# Patient Record
Sex: FEMALE | Race: WHITE | NOT HISPANIC OR LATINO | URBAN - METROPOLITAN AREA
[De-identification: names, ages, dates, MRNs, and addresses within clinical notes are randomized per-mention and may not be internally consistent; named-entity substitution may affect disease eponyms.]

---

## 2018-03-02 PROBLEM — Z00.00 ENCOUNTER FOR PREVENTIVE HEALTH EXAMINATION: Status: ACTIVE | Noted: 2018-03-02

## 2018-03-05 ENCOUNTER — OUTPATIENT (OUTPATIENT)
Dept: OUTPATIENT SERVICES | Facility: HOSPITAL | Age: 58
LOS: 1 days | Discharge: HOME | End: 2018-03-05
Payer: COMMERCIAL

## 2018-03-05 DIAGNOSIS — C50.919 MALIGNANT NEOPLASM OF UNSPECIFIED SITE OF UNSPECIFIED FEMALE BREAST: ICD-10-CM

## 2018-03-06 ENCOUNTER — RESULT REVIEW (OUTPATIENT)
Age: 58
End: 2018-03-06

## 2018-03-06 ENCOUNTER — APPOINTMENT (OUTPATIENT)
Dept: HEMATOLOGY ONCOLOGY | Facility: CLINIC | Age: 58
End: 2018-03-06
Payer: COMMERCIAL

## 2018-03-06 VITALS
HEIGHT: 63.86 IN | BODY MASS INDEX: 27.66 KG/M2 | OXYGEN SATURATION: 98 % | SYSTOLIC BLOOD PRESSURE: 116 MMHG | TEMPERATURE: 98.1 F | DIASTOLIC BLOOD PRESSURE: 75 MMHG | WEIGHT: 160.05 LBS | HEART RATE: 84 BPM | RESPIRATION RATE: 16 BRPM

## 2018-03-06 DIAGNOSIS — Z87.19 PERSONAL HISTORY OF OTHER DISEASES OF THE DIGESTIVE SYSTEM: ICD-10-CM

## 2018-03-06 DIAGNOSIS — I25.84 ATHEROSCLEROTIC HEART DISEASE OF NATIVE CORONARY ARTERY W/OUT ANGINA PECTORIS: ICD-10-CM

## 2018-03-06 DIAGNOSIS — J44.9 CHRONIC OBSTRUCTIVE PULMONARY DISEASE, UNSPECIFIED: ICD-10-CM

## 2018-03-06 DIAGNOSIS — E78.00 PURE HYPERCHOLESTEROLEMIA, UNSPECIFIED: ICD-10-CM

## 2018-03-06 DIAGNOSIS — R73.09 OTHER ABNORMAL GLUCOSE: ICD-10-CM

## 2018-03-06 DIAGNOSIS — N84.0 POLYP OF CORPUS UTERI: ICD-10-CM

## 2018-03-06 DIAGNOSIS — Z78.9 OTHER SPECIFIED HEALTH STATUS: ICD-10-CM

## 2018-03-06 DIAGNOSIS — I87.8 OTHER SPECIFIED DISORDERS OF VEINS: ICD-10-CM

## 2018-03-06 DIAGNOSIS — I25.10 ATHEROSCLEROTIC HEART DISEASE OF NATIVE CORONARY ARTERY W/OUT ANGINA PECTORIS: ICD-10-CM

## 2018-03-06 DIAGNOSIS — K29.80 DUODENITIS W/OUT BLEEDING: ICD-10-CM

## 2018-03-06 DIAGNOSIS — Z80.3 FAMILY HISTORY OF MALIGNANT NEOPLASM OF BREAST: ICD-10-CM

## 2018-03-06 DIAGNOSIS — Z87.891 PERSONAL HISTORY OF NICOTINE DEPENDENCE: ICD-10-CM

## 2018-03-06 LAB
HCT VFR BLD CALC: 40.7 % — SIGNIFICANT CHANGE UP (ref 34.5–45)
HGB BLD-MCNC: 14.2 G/DL — SIGNIFICANT CHANGE UP (ref 11.5–15.5)
MCHC RBC-ENTMCNC: 31.9 PG — SIGNIFICANT CHANGE UP (ref 27–34)
MCHC RBC-ENTMCNC: 35 G/DL — SIGNIFICANT CHANGE UP (ref 32–36)
MCV RBC AUTO: 91 FL — SIGNIFICANT CHANGE UP (ref 80–100)
PLATELET # BLD AUTO: 327 K/UL — SIGNIFICANT CHANGE UP (ref 150–400)
RBC # BLD: 4.47 M/UL — SIGNIFICANT CHANGE UP (ref 3.8–5.2)
RBC # FLD: 11.6 % — SIGNIFICANT CHANGE UP (ref 10.3–14.5)
WBC # BLD: 5.3 K/UL — SIGNIFICANT CHANGE UP (ref 3.8–10.5)
WBC # FLD AUTO: 5.3 K/UL — SIGNIFICANT CHANGE UP (ref 3.8–10.5)

## 2018-03-06 PROCEDURE — 99245 OFF/OP CONSLTJ NEW/EST HI 55: CPT

## 2018-03-06 RX ORDER — BUDESONIDE AND FORMOTEROL FUMARATE DIHYDRATE 160; 4.5 UG/1; UG/1
160-4.5 AEROSOL RESPIRATORY (INHALATION)
Refills: 0 | Status: ACTIVE | COMMUNITY
Start: 2018-03-06

## 2018-03-06 RX ORDER — ROSUVASTATIN CALCIUM 5 MG/1
5 TABLET, FILM COATED ORAL
Refills: 0 | Status: ACTIVE | COMMUNITY
Start: 2018-03-06

## 2018-03-07 ENCOUNTER — TRANSCRIPTION ENCOUNTER (OUTPATIENT)
Age: 58
End: 2018-03-07

## 2018-03-07 PROBLEM — R73.09 ELEVATED HEMOGLOBIN A1C: Status: ACTIVE | Noted: 2018-03-07

## 2018-03-07 PROBLEM — Z87.891 FORMER SMOKER: Status: ACTIVE | Noted: 2018-03-06

## 2018-03-07 LAB
ALBUMIN SERPL ELPH-MCNC: 4.5 G/DL
ALP BLD-CCNC: 68 U/L
ALT SERPL-CCNC: 23 U/L
ANION GAP SERPL CALC-SCNC: 14 MMOL/L
APTT BLD: 33.4 SEC
AST SERPL-CCNC: 26 U/L
BILIRUB SERPL-MCNC: 0.4 MG/DL
BUN SERPL-MCNC: 14 MG/DL
CALCIUM SERPL-MCNC: 9.6 MG/DL
CHLORIDE SERPL-SCNC: 101 MMOL/L
CO2 SERPL-SCNC: 29 MMOL/L
CREAT SERPL-MCNC: 1.09 MG/DL
GLUCOSE SERPL-MCNC: 94 MG/DL
INR PPP: 0.93 RATIO
POTASSIUM SERPL-SCNC: 4.9 MMOL/L
PROT SERPL-MCNC: 7.5 G/DL
PT BLD: 10.5 SEC
SODIUM SERPL-SCNC: 144 MMOL/L

## 2018-03-08 DIAGNOSIS — C50.912 MALIGNANT NEOPLASM OF UNSPECIFIED SITE OF LEFT FEMALE BREAST: ICD-10-CM

## 2018-03-12 ENCOUNTER — RESULT REVIEW (OUTPATIENT)
Age: 58
End: 2018-03-12

## 2018-03-12 PROCEDURE — 88321 CONSLTJ&REPRT SLD PREP ELSWR: CPT

## 2018-03-13 ENCOUNTER — FORM ENCOUNTER (OUTPATIENT)
Age: 58
End: 2018-03-13

## 2018-03-13 PROBLEM — N84.0 POLYP OF CORPUS UTERI: Chronic | Status: ACTIVE | Noted: 2018-03-09

## 2018-03-13 PROBLEM — K20.9 ESOPHAGITIS, UNSPECIFIED: Chronic | Status: ACTIVE | Noted: 2018-03-09

## 2018-03-13 PROBLEM — K29.70 GASTRITIS, UNSPECIFIED, WITHOUT BLEEDING: Chronic | Status: ACTIVE | Noted: 2018-03-09

## 2018-03-14 ENCOUNTER — OUTPATIENT (OUTPATIENT)
Dept: OUTPATIENT SERVICES | Facility: HOSPITAL | Age: 58
LOS: 1 days | End: 2018-03-14
Payer: COMMERCIAL

## 2018-03-14 DIAGNOSIS — C50.912 MALIGNANT NEOPLASM OF UNSPECIFIED SITE OF LEFT FEMALE BREAST: ICD-10-CM

## 2018-03-14 DIAGNOSIS — I87.8 OTHER SPECIFIED DISORDERS OF VEINS: ICD-10-CM

## 2018-03-14 DIAGNOSIS — Z98.890 OTHER SPECIFIED POSTPROCEDURAL STATES: Chronic | ICD-10-CM

## 2018-03-14 DIAGNOSIS — Z90.710 ACQUIRED ABSENCE OF BOTH CERVIX AND UTERUS: Chronic | ICD-10-CM

## 2018-03-14 PROCEDURE — C1769: CPT

## 2018-03-14 PROCEDURE — 76937 US GUIDE VASCULAR ACCESS: CPT | Mod: 26

## 2018-03-14 PROCEDURE — 36561 INSERT TUNNELED CV CATH: CPT

## 2018-03-14 PROCEDURE — C1894: CPT

## 2018-03-14 PROCEDURE — 77001 FLUOROGUIDE FOR VEIN DEVICE: CPT | Mod: 26

## 2018-03-14 PROCEDURE — 77001 FLUOROGUIDE FOR VEIN DEVICE: CPT

## 2018-03-14 PROCEDURE — 76937 US GUIDE VASCULAR ACCESS: CPT

## 2018-03-14 PROCEDURE — C1788: CPT

## 2018-03-15 DIAGNOSIS — Z45.2 ENCOUNTER FOR ADJUSTMENT AND MANAGEMENT OF VASCULAR ACCESS DEVICE: ICD-10-CM

## 2018-03-16 ENCOUNTER — APPOINTMENT (OUTPATIENT)
Dept: INFUSION THERAPY | Facility: HOSPITAL | Age: 58
End: 2018-03-16

## 2018-03-16 ENCOUNTER — RESULT REVIEW (OUTPATIENT)
Age: 58
End: 2018-03-16

## 2018-03-16 ENCOUNTER — LABORATORY RESULT (OUTPATIENT)
Age: 58
End: 2018-03-16

## 2018-03-16 ENCOUNTER — OTHER (OUTPATIENT)
Age: 58
End: 2018-03-16

## 2018-03-16 LAB
HCT VFR BLD CALC: 42 % — SIGNIFICANT CHANGE UP (ref 34.5–45)
HGB BLD-MCNC: 14.7 G/DL — SIGNIFICANT CHANGE UP (ref 11.5–15.5)
MCHC RBC-ENTMCNC: 31.7 PG — SIGNIFICANT CHANGE UP (ref 27–34)
MCHC RBC-ENTMCNC: 34.9 G/DL — SIGNIFICANT CHANGE UP (ref 32–36)
MCV RBC AUTO: 90.7 FL — SIGNIFICANT CHANGE UP (ref 80–100)
PLATELET # BLD AUTO: 328 K/UL — SIGNIFICANT CHANGE UP (ref 150–400)
RBC # BLD: 4.63 M/UL — SIGNIFICANT CHANGE UP (ref 3.8–5.2)
RBC # FLD: 11.7 % — SIGNIFICANT CHANGE UP (ref 10.3–14.5)
WBC # BLD: 9 K/UL — SIGNIFICANT CHANGE UP (ref 3.8–10.5)
WBC # FLD AUTO: 9 K/UL — SIGNIFICANT CHANGE UP (ref 3.8–10.5)

## 2018-03-19 DIAGNOSIS — Z51.11 ENCOUNTER FOR ANTINEOPLASTIC CHEMOTHERAPY: ICD-10-CM

## 2018-03-19 DIAGNOSIS — R11.2 NAUSEA WITH VOMITING, UNSPECIFIED: ICD-10-CM

## 2018-03-19 DIAGNOSIS — Z51.89 ENCOUNTER FOR OTHER SPECIFIED AFTERCARE: ICD-10-CM

## 2018-03-30 ENCOUNTER — APPOINTMENT (OUTPATIENT)
Dept: HEMATOLOGY ONCOLOGY | Facility: CLINIC | Age: 58
End: 2018-03-30
Payer: COMMERCIAL

## 2018-03-30 ENCOUNTER — APPOINTMENT (OUTPATIENT)
Dept: INFUSION THERAPY | Facility: HOSPITAL | Age: 58
End: 2018-03-30

## 2018-03-30 ENCOUNTER — RESULT REVIEW (OUTPATIENT)
Age: 58
End: 2018-03-30

## 2018-03-30 ENCOUNTER — LABORATORY RESULT (OUTPATIENT)
Age: 58
End: 2018-03-30

## 2018-03-30 VITALS
HEART RATE: 87 BPM | DIASTOLIC BLOOD PRESSURE: 78 MMHG | RESPIRATION RATE: 16 BRPM | OXYGEN SATURATION: 97 % | WEIGHT: 162 LBS | SYSTOLIC BLOOD PRESSURE: 115 MMHG | BODY MASS INDEX: 27.93 KG/M2 | TEMPERATURE: 98.3 F

## 2018-03-30 LAB
HCT VFR BLD CALC: 39 % — SIGNIFICANT CHANGE UP (ref 34.5–45)
HGB BLD-MCNC: 13.7 G/DL — SIGNIFICANT CHANGE UP (ref 11.5–15.5)
MCHC RBC-ENTMCNC: 31.7 PG — SIGNIFICANT CHANGE UP (ref 27–34)
MCHC RBC-ENTMCNC: 35.1 G/DL — SIGNIFICANT CHANGE UP (ref 32–36)
MCV RBC AUTO: 90.2 FL — SIGNIFICANT CHANGE UP (ref 80–100)
PLATELET # BLD AUTO: 345 K/UL — SIGNIFICANT CHANGE UP (ref 150–400)
RBC # BLD: 4.32 M/UL — SIGNIFICANT CHANGE UP (ref 3.8–5.2)
RBC # FLD: 11.7 % — SIGNIFICANT CHANGE UP (ref 10.3–14.5)
WBC # BLD: 10.5 K/UL — SIGNIFICANT CHANGE UP (ref 3.8–10.5)
WBC # FLD AUTO: 10.5 K/UL — SIGNIFICANT CHANGE UP (ref 3.8–10.5)

## 2018-03-30 PROCEDURE — 99214 OFFICE O/P EST MOD 30 MIN: CPT

## 2018-04-10 ENCOUNTER — OUTPATIENT (OUTPATIENT)
Dept: OUTPATIENT SERVICES | Facility: HOSPITAL | Age: 58
LOS: 1 days | Discharge: HOME | End: 2018-04-10

## 2018-04-10 DIAGNOSIS — Z98.890 OTHER SPECIFIED POSTPROCEDURAL STATES: Chronic | ICD-10-CM

## 2018-04-10 DIAGNOSIS — Z90.710 ACQUIRED ABSENCE OF BOTH CERVIX AND UTERUS: Chronic | ICD-10-CM

## 2018-04-10 DIAGNOSIS — C50.912 MALIGNANT NEOPLASM OF UNSPECIFIED SITE OF LEFT FEMALE BREAST: ICD-10-CM

## 2018-04-13 ENCOUNTER — RESULT REVIEW (OUTPATIENT)
Age: 58
End: 2018-04-13

## 2018-04-13 ENCOUNTER — APPOINTMENT (OUTPATIENT)
Dept: HEMATOLOGY ONCOLOGY | Facility: CLINIC | Age: 58
End: 2018-04-13
Payer: COMMERCIAL

## 2018-04-13 ENCOUNTER — LABORATORY RESULT (OUTPATIENT)
Age: 58
End: 2018-04-13

## 2018-04-13 ENCOUNTER — APPOINTMENT (OUTPATIENT)
Dept: INFUSION THERAPY | Facility: HOSPITAL | Age: 58
End: 2018-04-13

## 2018-04-13 VITALS
HEART RATE: 85 BPM | SYSTOLIC BLOOD PRESSURE: 131 MMHG | RESPIRATION RATE: 16 BRPM | TEMPERATURE: 98.3 F | OXYGEN SATURATION: 98 % | BODY MASS INDEX: 27.94 KG/M2 | WEIGHT: 162.04 LBS | DIASTOLIC BLOOD PRESSURE: 76 MMHG

## 2018-04-13 DIAGNOSIS — E55.9 VITAMIN D DEFICIENCY, UNSPECIFIED: ICD-10-CM

## 2018-04-13 LAB
HCT VFR BLD CALC: 34.9 % — SIGNIFICANT CHANGE UP (ref 34.5–45)
HGB BLD-MCNC: 12 G/DL — SIGNIFICANT CHANGE UP (ref 11.5–15.5)
MCHC RBC-ENTMCNC: 30.8 PG — SIGNIFICANT CHANGE UP (ref 27–34)
MCHC RBC-ENTMCNC: 34.2 G/DL — SIGNIFICANT CHANGE UP (ref 32–36)
MCV RBC AUTO: 89.9 FL — SIGNIFICANT CHANGE UP (ref 80–100)
PLATELET # BLD AUTO: 236 K/UL — SIGNIFICANT CHANGE UP (ref 150–400)
RBC # BLD: 3.88 M/UL — SIGNIFICANT CHANGE UP (ref 3.8–5.2)
RBC # FLD: 12.4 % — SIGNIFICANT CHANGE UP (ref 10.3–14.5)
WBC # BLD: 9.5 K/UL — SIGNIFICANT CHANGE UP (ref 3.8–10.5)
WBC # FLD AUTO: 9.5 K/UL — SIGNIFICANT CHANGE UP (ref 3.8–10.5)

## 2018-04-13 PROCEDURE — 99214 OFFICE O/P EST MOD 30 MIN: CPT

## 2018-04-13 RX ORDER — IBUPROFEN 200 MG/1
200 TABLET, COATED ORAL
Refills: 0 | Status: DISCONTINUED | COMMUNITY
Start: 2018-03-06 | End: 2018-04-13

## 2018-04-16 DIAGNOSIS — E86.0 DEHYDRATION: ICD-10-CM

## 2018-04-16 DIAGNOSIS — Z51.11 ENCOUNTER FOR ANTINEOPLASTIC CHEMOTHERAPY: ICD-10-CM

## 2018-04-16 DIAGNOSIS — R11.2 NAUSEA WITH VOMITING, UNSPECIFIED: ICD-10-CM

## 2018-04-16 DIAGNOSIS — Z51.89 ENCOUNTER FOR OTHER SPECIFIED AFTERCARE: ICD-10-CM

## 2018-04-26 ENCOUNTER — FORM ENCOUNTER (OUTPATIENT)
Age: 58
End: 2018-04-26

## 2018-04-27 ENCOUNTER — RESULT REVIEW (OUTPATIENT)
Age: 58
End: 2018-04-27

## 2018-04-27 ENCOUNTER — APPOINTMENT (OUTPATIENT)
Dept: RADIOLOGY | Facility: IMAGING CENTER | Age: 58
End: 2018-04-27
Payer: COMMERCIAL

## 2018-04-27 ENCOUNTER — APPOINTMENT (OUTPATIENT)
Dept: INFUSION THERAPY | Facility: HOSPITAL | Age: 58
End: 2018-04-27

## 2018-04-27 ENCOUNTER — LABORATORY RESULT (OUTPATIENT)
Age: 58
End: 2018-04-27

## 2018-04-27 ENCOUNTER — OUTPATIENT (OUTPATIENT)
Dept: OUTPATIENT SERVICES | Facility: HOSPITAL | Age: 58
LOS: 1 days | End: 2018-04-27
Payer: COMMERCIAL

## 2018-04-27 ENCOUNTER — APPOINTMENT (OUTPATIENT)
Dept: HEMATOLOGY ONCOLOGY | Facility: CLINIC | Age: 58
End: 2018-04-27
Payer: COMMERCIAL

## 2018-04-27 VITALS
BODY MASS INDEX: 28.13 KG/M2 | RESPIRATION RATE: 16 BRPM | OXYGEN SATURATION: 97 % | SYSTOLIC BLOOD PRESSURE: 112 MMHG | HEART RATE: 84 BPM | WEIGHT: 163.14 LBS | TEMPERATURE: 98.6 F | DIASTOLIC BLOOD PRESSURE: 68 MMHG

## 2018-04-27 DIAGNOSIS — Z90.710 ACQUIRED ABSENCE OF BOTH CERVIX AND UTERUS: Chronic | ICD-10-CM

## 2018-04-27 DIAGNOSIS — C50.912 MALIGNANT NEOPLASM OF UNSPECIFIED SITE OF LEFT FEMALE BREAST: ICD-10-CM

## 2018-04-27 DIAGNOSIS — Z98.890 OTHER SPECIFIED POSTPROCEDURAL STATES: Chronic | ICD-10-CM

## 2018-04-27 LAB
HCT VFR BLD CALC: 36.9 % — SIGNIFICANT CHANGE UP (ref 34.5–45)
HGB BLD-MCNC: 12.8 G/DL — SIGNIFICANT CHANGE UP (ref 11.5–15.5)
MCHC RBC-ENTMCNC: 31.8 PG — SIGNIFICANT CHANGE UP (ref 27–34)
MCHC RBC-ENTMCNC: 34.7 G/DL — SIGNIFICANT CHANGE UP (ref 32–36)
MCV RBC AUTO: 91.7 FL — SIGNIFICANT CHANGE UP (ref 80–100)
PLATELET # BLD AUTO: 220 K/UL — SIGNIFICANT CHANGE UP (ref 150–400)
RBC # BLD: 4.03 M/UL — SIGNIFICANT CHANGE UP (ref 3.8–5.2)
RBC # FLD: 13 % — SIGNIFICANT CHANGE UP (ref 10.3–14.5)
WBC # BLD: 13.1 K/UL — HIGH (ref 3.8–10.5)
WBC # FLD AUTO: 13.1 K/UL — HIGH (ref 3.8–10.5)

## 2018-04-27 PROCEDURE — 72100 X-RAY EXAM L-S SPINE 2/3 VWS: CPT | Mod: 26

## 2018-04-27 PROCEDURE — 72070 X-RAY EXAM THORAC SPINE 2VWS: CPT | Mod: 26

## 2018-04-27 PROCEDURE — 72100 X-RAY EXAM L-S SPINE 2/3 VWS: CPT

## 2018-04-27 PROCEDURE — 99214 OFFICE O/P EST MOD 30 MIN: CPT

## 2018-04-27 PROCEDURE — 72070 X-RAY EXAM THORAC SPINE 2VWS: CPT

## 2018-05-09 ENCOUNTER — OUTPATIENT (OUTPATIENT)
Dept: OUTPATIENT SERVICES | Facility: HOSPITAL | Age: 58
LOS: 1 days | Discharge: HOME | End: 2018-05-09

## 2018-05-09 DIAGNOSIS — C50.912 MALIGNANT NEOPLASM OF UNSPECIFIED SITE OF LEFT FEMALE BREAST: ICD-10-CM

## 2018-05-09 DIAGNOSIS — Z90.710 ACQUIRED ABSENCE OF BOTH CERVIX AND UTERUS: Chronic | ICD-10-CM

## 2018-05-09 DIAGNOSIS — Z98.890 OTHER SPECIFIED POSTPROCEDURAL STATES: Chronic | ICD-10-CM

## 2018-05-11 ENCOUNTER — APPOINTMENT (OUTPATIENT)
Dept: INFUSION THERAPY | Facility: HOSPITAL | Age: 58
End: 2018-05-11

## 2018-05-11 ENCOUNTER — APPOINTMENT (OUTPATIENT)
Dept: HEMATOLOGY ONCOLOGY | Facility: CLINIC | Age: 58
End: 2018-05-11
Payer: COMMERCIAL

## 2018-05-11 ENCOUNTER — RESULT REVIEW (OUTPATIENT)
Age: 58
End: 2018-05-11

## 2018-05-11 VITALS
HEART RATE: 80 BPM | DIASTOLIC BLOOD PRESSURE: 68 MMHG | BODY MASS INDEX: 27.94 KG/M2 | OXYGEN SATURATION: 100 % | TEMPERATURE: 98 F | RESPIRATION RATE: 16 BRPM | SYSTOLIC BLOOD PRESSURE: 108 MMHG | WEIGHT: 162.04 LBS

## 2018-05-11 LAB
HCT VFR BLD CALC: 37.6 % — SIGNIFICANT CHANGE UP (ref 34.5–45)
HGB BLD-MCNC: 13.2 G/DL — SIGNIFICANT CHANGE UP (ref 11.5–15.5)
MCHC RBC-ENTMCNC: 32.1 PG — SIGNIFICANT CHANGE UP (ref 27–34)
MCHC RBC-ENTMCNC: 35.1 G/DL — SIGNIFICANT CHANGE UP (ref 32–36)
MCV RBC AUTO: 91.4 FL — SIGNIFICANT CHANGE UP (ref 80–100)
PLATELET # BLD AUTO: 238 K/UL — SIGNIFICANT CHANGE UP (ref 150–400)
RBC # BLD: 4.11 M/UL — SIGNIFICANT CHANGE UP (ref 3.8–5.2)
RBC # FLD: 13.5 % — SIGNIFICANT CHANGE UP (ref 10.3–14.5)
WBC # BLD: 11.2 K/UL — HIGH (ref 3.8–10.5)
WBC # FLD AUTO: 11.2 K/UL — HIGH (ref 3.8–10.5)

## 2018-05-11 PROCEDURE — 99215 OFFICE O/P EST HI 40 MIN: CPT

## 2018-05-11 RX ORDER — CHOLECALCIFEROL (VITAMIN D3) 1250 MCG
1.25 MG CAPSULE ORAL
Refills: 0 | Status: DISCONTINUED | COMMUNITY
Start: 2018-03-06 | End: 2018-05-11

## 2018-05-14 DIAGNOSIS — R11.2 NAUSEA WITH VOMITING, UNSPECIFIED: ICD-10-CM

## 2018-05-14 DIAGNOSIS — E86.0 DEHYDRATION: ICD-10-CM

## 2018-05-14 DIAGNOSIS — Z51.11 ENCOUNTER FOR ANTINEOPLASTIC CHEMOTHERAPY: ICD-10-CM

## 2018-05-14 DIAGNOSIS — Z51.89 ENCOUNTER FOR OTHER SPECIFIED AFTERCARE: ICD-10-CM

## 2018-05-25 ENCOUNTER — APPOINTMENT (OUTPATIENT)
Dept: INFUSION THERAPY | Facility: HOSPITAL | Age: 58
End: 2018-05-25

## 2018-05-25 ENCOUNTER — RESULT REVIEW (OUTPATIENT)
Age: 58
End: 2018-05-25

## 2018-05-25 ENCOUNTER — APPOINTMENT (OUTPATIENT)
Dept: HEMATOLOGY ONCOLOGY | Facility: CLINIC | Age: 58
End: 2018-05-25
Payer: COMMERCIAL

## 2018-05-25 ENCOUNTER — LABORATORY RESULT (OUTPATIENT)
Age: 58
End: 2018-05-25

## 2018-05-25 VITALS
DIASTOLIC BLOOD PRESSURE: 80 MMHG | TEMPERATURE: 98.5 F | RESPIRATION RATE: 16 BRPM | BODY MASS INDEX: 28.89 KG/M2 | HEART RATE: 76 BPM | OXYGEN SATURATION: 100 % | SYSTOLIC BLOOD PRESSURE: 120 MMHG | WEIGHT: 167.55 LBS

## 2018-05-25 LAB
HCT VFR BLD CALC: 35.8 % — SIGNIFICANT CHANGE UP (ref 34.5–45)
HGB BLD-MCNC: 12.6 G/DL — SIGNIFICANT CHANGE UP (ref 11.5–15.5)
MCHC RBC-ENTMCNC: 31.9 PG — SIGNIFICANT CHANGE UP (ref 27–34)
MCHC RBC-ENTMCNC: 35.2 G/DL — SIGNIFICANT CHANGE UP (ref 32–36)
MCV RBC AUTO: 90.8 FL — SIGNIFICANT CHANGE UP (ref 80–100)
PLATELET # BLD AUTO: 207 K/UL — SIGNIFICANT CHANGE UP (ref 150–400)
RBC # BLD: 3.95 M/UL — SIGNIFICANT CHANGE UP (ref 3.8–5.2)
RBC # FLD: 13.6 % — SIGNIFICANT CHANGE UP (ref 10.3–14.5)
WBC # BLD: 11 K/UL — HIGH (ref 3.8–10.5)
WBC # FLD AUTO: 11 K/UL — HIGH (ref 3.8–10.5)

## 2018-05-25 PROCEDURE — 99214 OFFICE O/P EST MOD 30 MIN: CPT

## 2018-06-01 ENCOUNTER — OTHER (OUTPATIENT)
Age: 58
End: 2018-06-01

## 2018-06-04 ENCOUNTER — OTHER (OUTPATIENT)
Age: 58
End: 2018-06-04

## 2018-06-06 ENCOUNTER — MESSAGE (OUTPATIENT)
Age: 58
End: 2018-06-06

## 2018-06-08 ENCOUNTER — LABORATORY RESULT (OUTPATIENT)
Age: 58
End: 2018-06-08

## 2018-06-08 ENCOUNTER — APPOINTMENT (OUTPATIENT)
Dept: INFUSION THERAPY | Facility: HOSPITAL | Age: 58
End: 2018-06-08

## 2018-06-08 ENCOUNTER — RESULT REVIEW (OUTPATIENT)
Age: 58
End: 2018-06-08

## 2018-06-08 ENCOUNTER — APPOINTMENT (OUTPATIENT)
Dept: HEMATOLOGY ONCOLOGY | Facility: CLINIC | Age: 58
End: 2018-06-08
Payer: COMMERCIAL

## 2018-06-08 VITALS
HEART RATE: 76 BPM | DIASTOLIC BLOOD PRESSURE: 70 MMHG | OXYGEN SATURATION: 99 % | RESPIRATION RATE: 16 BRPM | SYSTOLIC BLOOD PRESSURE: 110 MMHG | BODY MASS INDEX: 29.27 KG/M2 | TEMPERATURE: 98.2 F | WEIGHT: 169.76 LBS

## 2018-06-08 LAB
BASOPHILS # BLD AUTO: 0 K/UL — SIGNIFICANT CHANGE UP (ref 0–0.2)
BASOPHILS NFR BLD AUTO: 0 % — SIGNIFICANT CHANGE UP (ref 0–2)
BUN SERPL-MCNC: 8 MG/DL — SIGNIFICANT CHANGE UP (ref 7–23)
CA-I BLDA-SCNC: 1.11 MMOL/L — LOW (ref 1.12–1.3)
CHLORIDE SERPL-SCNC: 101 MMOL/L — SIGNIFICANT CHANGE UP (ref 96–108)
CO2 SERPL-SCNC: 28 MMOL/L — SIGNIFICANT CHANGE UP (ref 22–31)
CREAT SERPL-MCNC: 1 MG/DL — SIGNIFICANT CHANGE UP (ref 0.5–1.3)
EOSINOPHIL # BLD AUTO: 0.3 K/UL — SIGNIFICANT CHANGE UP (ref 0–0.5)
EOSINOPHIL NFR BLD AUTO: 3.8 % — SIGNIFICANT CHANGE UP (ref 0–6)
GLUCOSE SERPL-MCNC: 102 MG/DL — HIGH (ref 70–99)
HCT VFR BLD CALC: 33.6 % — LOW (ref 34.5–45)
HGB BLD-MCNC: 11.6 G/DL — SIGNIFICANT CHANGE UP (ref 11.5–15.5)
LYMPHOCYTES # BLD AUTO: 0.8 K/UL — LOW (ref 1–3.3)
LYMPHOCYTES # BLD AUTO: 9.3 % — LOW (ref 13–44)
MCHC RBC-ENTMCNC: 31.5 PG — SIGNIFICANT CHANGE UP (ref 27–34)
MCHC RBC-ENTMCNC: 34.3 G/DL — SIGNIFICANT CHANGE UP (ref 32–36)
MCV RBC AUTO: 91.7 FL — SIGNIFICANT CHANGE UP (ref 80–100)
MONOCYTES # BLD AUTO: 0.3 K/UL — SIGNIFICANT CHANGE UP (ref 0–0.9)
MONOCYTES NFR BLD AUTO: 4 % — SIGNIFICANT CHANGE UP (ref 2–14)
NEUTROPHILS # BLD AUTO: 7.1 K/UL — SIGNIFICANT CHANGE UP (ref 1.8–7.4)
NEUTROPHILS NFR BLD AUTO: 82.9 % — HIGH (ref 43–77)
PLATELET # BLD AUTO: 256 K/UL — SIGNIFICANT CHANGE UP (ref 150–400)
POTASSIUM SERPL-MCNC: 3.8 MMOL/L — SIGNIFICANT CHANGE UP (ref 3.5–5.3)
POTASSIUM SERPL-SCNC: 3.8 MMOL/L — SIGNIFICANT CHANGE UP (ref 3.5–5.3)
RBC # BLD: 3.67 M/UL — LOW (ref 3.8–5.2)
RBC # FLD: 13.9 % — SIGNIFICANT CHANGE UP (ref 10.3–14.5)
SODIUM SERPL-SCNC: 139 MMOL/L — SIGNIFICANT CHANGE UP (ref 135–145)
WBC # BLD: 8.6 K/UL — SIGNIFICANT CHANGE UP (ref 3.8–10.5)
WBC # FLD AUTO: 8.6 K/UL — SIGNIFICANT CHANGE UP (ref 3.8–10.5)

## 2018-06-08 PROCEDURE — 99215 OFFICE O/P EST HI 40 MIN: CPT

## 2018-06-20 ENCOUNTER — OUTPATIENT (OUTPATIENT)
Dept: OUTPATIENT SERVICES | Facility: HOSPITAL | Age: 58
LOS: 1 days | Discharge: HOME | End: 2018-06-20

## 2018-06-20 DIAGNOSIS — Z90.710 ACQUIRED ABSENCE OF BOTH CERVIX AND UTERUS: Chronic | ICD-10-CM

## 2018-06-20 DIAGNOSIS — Z98.890 OTHER SPECIFIED POSTPROCEDURAL STATES: Chronic | ICD-10-CM

## 2018-06-20 DIAGNOSIS — C50.912 MALIGNANT NEOPLASM OF UNSPECIFIED SITE OF LEFT FEMALE BREAST: ICD-10-CM

## 2018-06-21 ENCOUNTER — FORM ENCOUNTER (OUTPATIENT)
Age: 58
End: 2018-06-21

## 2018-06-21 ENCOUNTER — MESSAGE (OUTPATIENT)
Age: 58
End: 2018-06-21

## 2018-06-22 ENCOUNTER — APPOINTMENT (OUTPATIENT)
Dept: HEMATOLOGY ONCOLOGY | Facility: CLINIC | Age: 58
End: 2018-06-22
Payer: COMMERCIAL

## 2018-06-22 ENCOUNTER — RESULT REVIEW (OUTPATIENT)
Age: 58
End: 2018-06-22

## 2018-06-22 ENCOUNTER — LABORATORY RESULT (OUTPATIENT)
Age: 58
End: 2018-06-22

## 2018-06-22 ENCOUNTER — APPOINTMENT (OUTPATIENT)
Dept: RADIOLOGY | Facility: IMAGING CENTER | Age: 58
End: 2018-06-22
Payer: COMMERCIAL

## 2018-06-22 ENCOUNTER — APPOINTMENT (OUTPATIENT)
Dept: HEMATOLOGY ONCOLOGY | Facility: CLINIC | Age: 58
End: 2018-06-22
Payer: SELF-PAY

## 2018-06-22 ENCOUNTER — OUTPATIENT (OUTPATIENT)
Dept: OUTPATIENT SERVICES | Facility: HOSPITAL | Age: 58
LOS: 1 days | End: 2018-06-22
Payer: COMMERCIAL

## 2018-06-22 ENCOUNTER — APPOINTMENT (OUTPATIENT)
Dept: INFUSION THERAPY | Facility: HOSPITAL | Age: 58
End: 2018-06-22

## 2018-06-22 VITALS
WEIGHT: 170.86 LBS | SYSTOLIC BLOOD PRESSURE: 110 MMHG | TEMPERATURE: 99.2 F | RESPIRATION RATE: 16 BRPM | BODY MASS INDEX: 29.46 KG/M2 | DIASTOLIC BLOOD PRESSURE: 72 MMHG | HEART RATE: 89 BPM

## 2018-06-22 DIAGNOSIS — Z00.8 ENCOUNTER FOR OTHER GENERAL EXAMINATION: ICD-10-CM

## 2018-06-22 DIAGNOSIS — Z90.710 ACQUIRED ABSENCE OF BOTH CERVIX AND UTERUS: Chronic | ICD-10-CM

## 2018-06-22 DIAGNOSIS — R05 COUGH: ICD-10-CM

## 2018-06-22 DIAGNOSIS — Z98.890 OTHER SPECIFIED POSTPROCEDURAL STATES: Chronic | ICD-10-CM

## 2018-06-22 LAB
BUN SERPL-MCNC: 9 MG/DL — SIGNIFICANT CHANGE UP (ref 7–23)
CA-I BLDA-SCNC: 1.1 MMOL/L — LOW (ref 1.12–1.3)
CHLORIDE SERPL-SCNC: 101 MMOL/L — SIGNIFICANT CHANGE UP (ref 96–108)
CO2 SERPL-SCNC: 27 MMOL/L — SIGNIFICANT CHANGE UP (ref 22–31)
CREAT SERPL-MCNC: 0.8 MG/DL — SIGNIFICANT CHANGE UP (ref 0.5–1.3)
GLUCOSE SERPL-MCNC: 104 MG/DL — HIGH (ref 70–99)
HCT VFR BLD CALC: 35.1 % — SIGNIFICANT CHANGE UP (ref 34.5–45)
HGB BLD-MCNC: 12 G/DL — SIGNIFICANT CHANGE UP (ref 11.5–15.5)
MCHC RBC-ENTMCNC: 31.5 PG — SIGNIFICANT CHANGE UP (ref 27–34)
MCHC RBC-ENTMCNC: 34 G/DL — SIGNIFICANT CHANGE UP (ref 32–36)
MCV RBC AUTO: 92.4 FL — SIGNIFICANT CHANGE UP (ref 80–100)
PLATELET # BLD AUTO: 223 K/UL — SIGNIFICANT CHANGE UP (ref 150–400)
POTASSIUM SERPL-MCNC: 3.6 MMOL/L — SIGNIFICANT CHANGE UP (ref 3.5–5.3)
POTASSIUM SERPL-SCNC: 3.6 MMOL/L — SIGNIFICANT CHANGE UP (ref 3.5–5.3)
RBC # BLD: 3.8 M/UL — SIGNIFICANT CHANGE UP (ref 3.8–5.2)
RBC # FLD: 14.3 % — SIGNIFICANT CHANGE UP (ref 10.3–14.5)
SODIUM SERPL-SCNC: 140 MMOL/L — SIGNIFICANT CHANGE UP (ref 135–145)
WBC # BLD: 10.9 K/UL — HIGH (ref 3.8–10.5)
WBC # FLD AUTO: 10.9 K/UL — HIGH (ref 3.8–10.5)

## 2018-06-22 PROCEDURE — 99214 OFFICE O/P EST MOD 30 MIN: CPT

## 2018-06-22 PROCEDURE — 96040M: CUSTOM

## 2018-06-22 PROCEDURE — 77080 DXA BONE DENSITY AXIAL: CPT | Mod: 26

## 2018-06-22 PROCEDURE — 77080 DXA BONE DENSITY AXIAL: CPT

## 2018-06-25 DIAGNOSIS — Z51.11 ENCOUNTER FOR ANTINEOPLASTIC CHEMOTHERAPY: ICD-10-CM

## 2018-06-25 DIAGNOSIS — Z51.89 ENCOUNTER FOR OTHER SPECIFIED AFTERCARE: ICD-10-CM

## 2018-06-25 DIAGNOSIS — R11.2 NAUSEA WITH VOMITING, UNSPECIFIED: ICD-10-CM

## 2018-06-25 DIAGNOSIS — E86.0 DEHYDRATION: ICD-10-CM

## 2018-07-02 ENCOUNTER — FORM ENCOUNTER (OUTPATIENT)
Age: 58
End: 2018-07-02

## 2018-07-03 ENCOUNTER — RESULT REVIEW (OUTPATIENT)
Age: 58
End: 2018-07-03

## 2018-07-03 ENCOUNTER — OUTPATIENT (OUTPATIENT)
Dept: OUTPATIENT SERVICES | Facility: HOSPITAL | Age: 58
LOS: 1 days | End: 2018-07-03
Payer: COMMERCIAL

## 2018-07-03 ENCOUNTER — APPOINTMENT (OUTPATIENT)
Dept: RADIOLOGY | Facility: IMAGING CENTER | Age: 58
End: 2018-07-03
Payer: COMMERCIAL

## 2018-07-03 ENCOUNTER — APPOINTMENT (OUTPATIENT)
Dept: HEMATOLOGY ONCOLOGY | Facility: CLINIC | Age: 58
End: 2018-07-03
Payer: COMMERCIAL

## 2018-07-03 VITALS
OXYGEN SATURATION: 99 % | DIASTOLIC BLOOD PRESSURE: 60 MMHG | SYSTOLIC BLOOD PRESSURE: 100 MMHG | RESPIRATION RATE: 17 BRPM | BODY MASS INDEX: 29.27 KG/M2 | TEMPERATURE: 98.7 F | HEART RATE: 76 BPM | WEIGHT: 169.76 LBS

## 2018-07-03 DIAGNOSIS — R05 COUGH: ICD-10-CM

## 2018-07-03 DIAGNOSIS — D68.9 COAGULATION DEFECT, UNSPECIFIED: ICD-10-CM

## 2018-07-03 DIAGNOSIS — T45.1X5A NAUSEA: ICD-10-CM

## 2018-07-03 DIAGNOSIS — E83.51 HYPOCALCEMIA: ICD-10-CM

## 2018-07-03 DIAGNOSIS — Z98.890 OTHER SPECIFIED POSTPROCEDURAL STATES: Chronic | ICD-10-CM

## 2018-07-03 DIAGNOSIS — C50.912 MALIGNANT NEOPLASM OF UNSPECIFIED SITE OF LEFT FEMALE BREAST: ICD-10-CM

## 2018-07-03 DIAGNOSIS — Z90.710 ACQUIRED ABSENCE OF BOTH CERVIX AND UTERUS: Chronic | ICD-10-CM

## 2018-07-03 DIAGNOSIS — T22.012A BURN OF UNSPECIFIED DEGREE OF LEFT FOREARM, INITIAL ENCOUNTER: ICD-10-CM

## 2018-07-03 DIAGNOSIS — R11.0 NAUSEA: ICD-10-CM

## 2018-07-03 LAB
ALBUMIN SERPL ELPH-MCNC: 3.7 G/DL
ALP BLD-CCNC: 137 U/L
ALT SERPL-CCNC: 23 U/L
ANION GAP SERPL CALC-SCNC: 15 MMOL/L
APTT BLD: 29.6 SEC
AST SERPL-CCNC: 22 U/L
BILIRUB SERPL-MCNC: 0.3 MG/DL
BUN SERPL-MCNC: 13 MG/DL
CA-I BLD-SCNC: 1.18 MMOL/L — SIGNIFICANT CHANGE UP (ref 1.12–1.3)
CALCIUM SERPL-MCNC: 9.2 MG/DL
CHLORIDE SERPL-SCNC: 104 MMOL/L
CO2 SERPL-SCNC: 27 MMOL/L
CREAT SERPL-MCNC: 1 MG/DL
GLUCOSE SERPL-MCNC: 130 MG/DL
HCT VFR BLD CALC: 34.5 % — SIGNIFICANT CHANGE UP (ref 34.5–45)
HGB BLD-MCNC: 11.5 G/DL — SIGNIFICANT CHANGE UP (ref 11.5–15.5)
INR PPP: 1.1 RATIO
MCHC RBC-ENTMCNC: 31.2 PG — SIGNIFICANT CHANGE UP (ref 27–34)
MCHC RBC-ENTMCNC: 33.4 G/DL — SIGNIFICANT CHANGE UP (ref 32–36)
MCV RBC AUTO: 93.4 FL — SIGNIFICANT CHANGE UP (ref 80–100)
PHOSPHATE SERPL-MCNC: 3.9 MG/DL
PLATELET # BLD AUTO: 162 K/UL — SIGNIFICANT CHANGE UP (ref 150–400)
POTASSIUM SERPL-SCNC: 4.4 MMOL/L
PROT SERPL-MCNC: 6.3 G/DL
PT BLD: 12.5 SEC
RBC # BLD: 3.69 M/UL — LOW (ref 3.8–5.2)
RBC # FLD: 14.1 % — SIGNIFICANT CHANGE UP (ref 10.3–14.5)
SODIUM SERPL-SCNC: 146 MMOL/L
WBC # BLD: 5.2 K/UL — SIGNIFICANT CHANGE UP (ref 3.8–10.5)
WBC # FLD AUTO: 5.2 K/UL — SIGNIFICANT CHANGE UP (ref 3.8–10.5)

## 2018-07-03 PROCEDURE — 71046 X-RAY EXAM CHEST 2 VIEWS: CPT | Mod: 26

## 2018-07-03 PROCEDURE — 99215 OFFICE O/P EST HI 40 MIN: CPT

## 2018-07-03 PROCEDURE — 71046 X-RAY EXAM CHEST 2 VIEWS: CPT

## 2018-07-03 PROCEDURE — 82330 ASSAY OF CALCIUM: CPT

## 2018-07-03 RX ORDER — METOCLOPRAMIDE 10 MG/1
10 TABLET ORAL EVERY 6 HOURS
Qty: 20 | Refills: 3 | Status: DISCONTINUED | COMMUNITY
Start: 2018-03-11 | End: 2018-07-03

## 2018-07-03 RX ORDER — CALCIUM CARBONATE/VITAMIN D3 600 MG-20
600-800 TABLET ORAL
Refills: 0 | Status: ACTIVE | COMMUNITY
Start: 2018-07-03

## 2018-07-03 RX ORDER — DEXAMETHASONE 4 MG/1
4 TABLET ORAL
Qty: 20 | Refills: 1 | Status: DISCONTINUED | COMMUNITY
Start: 2018-03-11 | End: 2018-07-03

## 2018-07-03 RX ORDER — LORAZEPAM 0.5 MG/1
0.5 TABLET ORAL
Qty: 30 | Refills: 0 | Status: DISCONTINUED | COMMUNITY
Start: 2018-03-11 | End: 2018-07-03

## 2018-07-03 RX ORDER — APREPITANT 80 MG/1
80 CAPSULE ORAL
Qty: 1 | Refills: 2 | Status: DISCONTINUED | COMMUNITY
Start: 2018-03-11 | End: 2018-07-03

## 2018-07-10 PROBLEM — R05 DRY COUGH: Status: ACTIVE | Noted: 2018-06-08

## 2018-07-10 LAB — CA-I SERPL-SCNC: 1.09 MMOL/L

## 2018-07-13 ENCOUNTER — APPOINTMENT (OUTPATIENT)
Dept: ENDOCRINOLOGY | Facility: CLINIC | Age: 58
End: 2018-07-13
Payer: COMMERCIAL

## 2018-07-13 VITALS
SYSTOLIC BLOOD PRESSURE: 110 MMHG | WEIGHT: 170 LBS | OXYGEN SATURATION: 98 % | HEIGHT: 63.86 IN | HEART RATE: 93 BPM | DIASTOLIC BLOOD PRESSURE: 70 MMHG | BODY MASS INDEX: 29.38 KG/M2

## 2018-07-13 DIAGNOSIS — Z79.811 LONG TERM (CURRENT) USE OF AROMATASE INHIBITORS: ICD-10-CM

## 2018-07-13 DIAGNOSIS — E83.51 HYPOCALCEMIA: ICD-10-CM

## 2018-07-13 PROCEDURE — 99244 OFF/OP CNSLTJ NEW/EST MOD 40: CPT

## 2018-07-15 ENCOUNTER — FORM ENCOUNTER (OUTPATIENT)
Age: 58
End: 2018-07-15

## 2018-07-15 LAB
24R-OH-CALCIDIOL SERPL-MCNC: 39.2 PG/ML
25(OH)D3 SERPL-MCNC: 27.5 NG/ML
ALBUMIN SERPL ELPH-MCNC: 4.1 G/DL
ALP BLD-CCNC: 102 U/L
ALT SERPL-CCNC: 30 U/L
ANION GAP SERPL CALC-SCNC: 14 MMOL/L
AST SERPL-CCNC: 31 U/L
BILIRUB SERPL-MCNC: 0.4 MG/DL
BUN SERPL-MCNC: 10 MG/DL
CALCIUM SERPL-MCNC: 9.3 MG/DL
CALCIUM SERPL-MCNC: 9.3 MG/DL
CHLORIDE SERPL-SCNC: 104 MMOL/L
CO2 SERPL-SCNC: 28 MMOL/L
CREAT SERPL-MCNC: 0.92 MG/DL
GLUCOSE SERPL-MCNC: 87 MG/DL
MAGNESIUM SERPL-MCNC: 2.2 MG/DL
PARATHYROID HORMONE INTACT: 30 PG/ML
PHOSPHATE SERPL-MCNC: 4.4 MG/DL
POTASSIUM SERPL-SCNC: 4.3 MMOL/L
PROT SERPL-MCNC: 6.7 G/DL
SODIUM SERPL-SCNC: 146 MMOL/L

## 2018-07-16 ENCOUNTER — OUTPATIENT (OUTPATIENT)
Dept: OUTPATIENT SERVICES | Facility: HOSPITAL | Age: 58
LOS: 1 days | End: 2018-07-16
Payer: COMMERCIAL

## 2018-07-16 DIAGNOSIS — C50.912 MALIGNANT NEOPLASM OF UNSPECIFIED SITE OF LEFT FEMALE BREAST: ICD-10-CM

## 2018-07-16 DIAGNOSIS — Z90.710 ACQUIRED ABSENCE OF BOTH CERVIX AND UTERUS: Chronic | ICD-10-CM

## 2018-07-16 DIAGNOSIS — Z98.890 OTHER SPECIFIED POSTPROCEDURAL STATES: Chronic | ICD-10-CM

## 2018-07-16 DIAGNOSIS — I87.8 OTHER SPECIFIED DISORDERS OF VEINS: ICD-10-CM

## 2018-07-16 PROCEDURE — 36590 REMOVAL TUNNELED CV CATH: CPT

## 2018-07-16 PROCEDURE — 77001 FLUOROGUIDE FOR VEIN DEVICE: CPT

## 2018-07-16 PROCEDURE — 77001 FLUOROGUIDE FOR VEIN DEVICE: CPT | Mod: 26

## 2018-07-18 DIAGNOSIS — Z45.2 ENCOUNTER FOR ADJUSTMENT AND MANAGEMENT OF VASCULAR ACCESS DEVICE: ICD-10-CM

## 2018-07-18 DIAGNOSIS — C50.919 MALIGNANT NEOPLASM OF UNSPECIFIED SITE OF UNSPECIFIED FEMALE BREAST: ICD-10-CM

## 2018-08-02 ENCOUNTER — MEDICATION RENEWAL (OUTPATIENT)
Age: 58
End: 2018-08-02

## 2018-08-20 ENCOUNTER — APPOINTMENT (OUTPATIENT)
Dept: ENDOCRINOLOGY | Facility: CLINIC | Age: 58
End: 2018-08-20

## 2018-10-04 ENCOUNTER — OUTPATIENT (OUTPATIENT)
Dept: OUTPATIENT SERVICES | Facility: HOSPITAL | Age: 58
LOS: 1 days | Discharge: HOME | End: 2018-10-04

## 2018-10-04 DIAGNOSIS — C50.912 MALIGNANT NEOPLASM OF UNSPECIFIED SITE OF LEFT FEMALE BREAST: ICD-10-CM

## 2018-10-04 DIAGNOSIS — Z90.710 ACQUIRED ABSENCE OF BOTH CERVIX AND UTERUS: Chronic | ICD-10-CM

## 2018-10-04 DIAGNOSIS — Z98.890 OTHER SPECIFIED POSTPROCEDURAL STATES: Chronic | ICD-10-CM

## 2018-10-12 ENCOUNTER — APPOINTMENT (OUTPATIENT)
Dept: HEMATOLOGY ONCOLOGY | Facility: CLINIC | Age: 58
End: 2018-10-12
Payer: COMMERCIAL

## 2018-10-12 ENCOUNTER — RESULT REVIEW (OUTPATIENT)
Age: 58
End: 2018-10-12

## 2018-10-12 ENCOUNTER — APPOINTMENT (OUTPATIENT)
Dept: INFUSION THERAPY | Facility: HOSPITAL | Age: 58
End: 2018-10-12
Payer: COMMERCIAL

## 2018-10-12 VITALS
RESPIRATION RATE: 18 BRPM | BODY MASS INDEX: 27.75 KG/M2 | SYSTOLIC BLOOD PRESSURE: 109 MMHG | HEART RATE: 81 BPM | OXYGEN SATURATION: 100 % | DIASTOLIC BLOOD PRESSURE: 74 MMHG | TEMPERATURE: 98.3 F | WEIGHT: 160.94 LBS

## 2018-10-12 LAB
HCT VFR BLD CALC: 41.5 % — SIGNIFICANT CHANGE UP (ref 34.5–45)
HGB BLD-MCNC: 14.3 G/DL — SIGNIFICANT CHANGE UP (ref 11.5–15.5)
MCHC RBC-ENTMCNC: 31.2 PG — SIGNIFICANT CHANGE UP (ref 27–34)
MCHC RBC-ENTMCNC: 34.4 G/DL — SIGNIFICANT CHANGE UP (ref 32–36)
MCV RBC AUTO: 90.6 FL — SIGNIFICANT CHANGE UP (ref 80–100)
PLATELET # BLD AUTO: 332 K/UL — SIGNIFICANT CHANGE UP (ref 150–400)
RBC # BLD: 4.58 M/UL — SIGNIFICANT CHANGE UP (ref 3.8–5.2)
RBC # FLD: 11.7 % — SIGNIFICANT CHANGE UP (ref 10.3–14.5)
WBC # BLD: 4.4 K/UL — SIGNIFICANT CHANGE UP (ref 3.8–10.5)
WBC # FLD AUTO: 4.4 K/UL — SIGNIFICANT CHANGE UP (ref 3.8–10.5)

## 2018-10-12 PROCEDURE — G0009: CPT

## 2018-10-12 PROCEDURE — 99214 OFFICE O/P EST MOD 30 MIN: CPT | Mod: 25

## 2018-10-12 RX ORDER — DENOSUMAB 60 MG/ML
60 INJECTION SUBCUTANEOUS
Qty: 1 | Refills: 1 | Status: DISCONTINUED | COMMUNITY
Start: 2018-08-02 | End: 2018-10-12

## 2018-10-12 RX ORDER — LIDOCAINE AND PRILOCAINE 25; 25 MG/G; MG/G
2.5-2.5 CREAM TOPICAL
Qty: 1 | Refills: 1 | Status: DISCONTINUED | COMMUNITY
Start: 2018-03-30 | End: 2018-10-12

## 2018-10-12 RX ORDER — PANTOPRAZOLE SODIUM 40 MG/10ML
40 INJECTION, POWDER, FOR SOLUTION INTRAVENOUS
Refills: 0 | Status: DISCONTINUED | COMMUNITY
Start: 2018-03-06 | End: 2018-10-12

## 2018-10-12 RX ORDER — SILVER SULFADIAZINE 10 MG/G
1 CREAM TOPICAL DAILY
Qty: 1 | Refills: 0 | Status: DISCONTINUED | COMMUNITY
Start: 2018-06-08 | End: 2018-10-12

## 2018-10-25 LAB
ALBUMIN SERPL ELPH-MCNC: 4.5 G/DL
ALP BLD-CCNC: 81 U/L
ALT SERPL-CCNC: 18 U/L
ANION GAP SERPL CALC-SCNC: 13 MMOL/L
AST SERPL-CCNC: 21 U/L
BILIRUB SERPL-MCNC: 0.2 MG/DL
BUN SERPL-MCNC: 16 MG/DL
CALCIUM SERPL-MCNC: 9.9 MG/DL
CHLORIDE SERPL-SCNC: 102 MMOL/L
CO2 SERPL-SCNC: 27 MMOL/L
CREAT SERPL-MCNC: 0.96 MG/DL
GLUCOSE SERPL-MCNC: 90 MG/DL
POTASSIUM SERPL-SCNC: 5 MMOL/L
PROT SERPL-MCNC: 7.3 G/DL
SODIUM SERPL-SCNC: 142 MMOL/L

## 2018-10-30 ENCOUNTER — MESSAGE (OUTPATIENT)
Age: 58
End: 2018-10-30

## 2018-12-19 ENCOUNTER — OUTPATIENT (OUTPATIENT)
Dept: OUTPATIENT SERVICES | Facility: HOSPITAL | Age: 58
LOS: 1 days | Discharge: HOME | End: 2018-12-19

## 2018-12-19 DIAGNOSIS — Z90.710 ACQUIRED ABSENCE OF BOTH CERVIX AND UTERUS: Chronic | ICD-10-CM

## 2018-12-19 DIAGNOSIS — C50.912 MALIGNANT NEOPLASM OF UNSPECIFIED SITE OF LEFT FEMALE BREAST: ICD-10-CM

## 2018-12-19 DIAGNOSIS — Z98.890 OTHER SPECIFIED POSTPROCEDURAL STATES: Chronic | ICD-10-CM

## 2019-01-04 ENCOUNTER — APPOINTMENT (OUTPATIENT)
Dept: HEMATOLOGY ONCOLOGY | Facility: CLINIC | Age: 59
End: 2019-01-04
Payer: COMMERCIAL

## 2019-01-04 VITALS
OXYGEN SATURATION: 99 % | DIASTOLIC BLOOD PRESSURE: 91 MMHG | HEART RATE: 74 BPM | WEIGHT: 167.55 LBS | RESPIRATION RATE: 16 BRPM | TEMPERATURE: 97.8 F | SYSTOLIC BLOOD PRESSURE: 142 MMHG | BODY MASS INDEX: 28.89 KG/M2

## 2019-01-04 DIAGNOSIS — K59.00 CONSTIPATION, UNSPECIFIED: ICD-10-CM

## 2019-01-04 DIAGNOSIS — Z13.79 ENCOUNTER FOR OTHER SCREENING FOR GENETIC AND CHROMOSOMAL ANOMALIES: ICD-10-CM

## 2019-01-04 DIAGNOSIS — R07.89 OTHER CHEST PAIN: ICD-10-CM

## 2019-01-04 PROCEDURE — 99215 OFFICE O/P EST HI 40 MIN: CPT

## 2019-01-04 RX ORDER — LORATADINE 5 MG/5 ML
10 SOLUTION, ORAL ORAL
Refills: 0 | Status: DISCONTINUED | COMMUNITY
Start: 2018-10-12 | End: 2019-01-04

## 2019-01-04 RX ORDER — ACETAMINOPHEN 500 MG/1
500 TABLET, COATED ORAL
Refills: 0 | Status: DISCONTINUED | COMMUNITY
Start: 2018-03-06 | End: 2019-01-04

## 2019-01-04 NOTE — PHYSICAL EXAM
[Fully active, able to carry on all pre-disease performance without restriction] : Status 0 - Fully active, able to carry on all pre-disease performance without restriction [Normal] : grossly intact [de-identified] : Asymmetry, right breast larger than left. right breast; no mass. Left breast: scars at 6:00 and in axilla. 4 cm area full LIQ. Has tenderenss left inferior breast and left chest wall under lower breast. [de-identified] : Pfannensteil incision [de-identified] : Calm

## 2019-01-04 NOTE — REVIEW OF SYSTEMS
[Constipation] : constipation [Negative] : Allergic/Immunologic [Wheezing] : no wheezing [Cough] : no cough [FreeTextEntry2] : Energy is good. Had  flu vaccination  10/12/2018 [FreeTextEntry3] : Blurry vision  has resolved. The patient states she has  "serious floater and black [place]" since one week, has upcoming appointment with ophthalmologist 01/14/2018 [FreeTextEntry5] : Most recent echo 1/1/2018 EF=>55% [FreeTextEntry6] : See interval history. [FreeTextEntry7] : Most recent colonoscopy 12/17/2017.Appetite is good. Constipated  since one week, mild relief with Dulcolax. [FreeTextEntry8] : s/p DANISH without oophorectomy. Most recent GYN exam 6/22/2018, exam stable, had pap and TVU. [FreeTextEntry9] : Most recent BMD 6/22/2018, osteopenia of femoral neck. [de-identified] : story.

## 2019-01-04 NOTE — HISTORY OF PRESENT ILLNESS
[Disease: _____________________] : Disease: [unfilled] [T: ___] : T[unfilled] [N: ___] : N[unfilled] [M: ___] : M[unfilled] [AJCC Stage: ____] : AJCC Stage: [unfilled] [de-identified] : The patient presented in 2017, at age 57, with a left breast mass noted as an incidental finding on chest CT.\par \par The patient was referred by her pulmonologist for chest CT on 2017 and was noted to have a 12 mm left breast nodule. Mammogram and breast ultrasound were performed on 2017 and demonstrated a prepectoral suspicious irregularly shaped and marginated mass with pleomorphic calcifications. Ultrasound on the same date confirmed the presence of a 1.2 cm mass.\par \par Repeat ultrasound in 2017 at [facility] confirmed the presence of a 1.2 x 0.9 cm mass. Ultrasound-guided core biopsy was performed on 2017 and was positive for poorly differentiated infiltrating ductal carcinoma which was estrogen receptor positive (greater than 99%), progesterone receptor positive (40%), HER-2/aiyana 2+ by IHC and not amplified by FISH. There was also DCIS present.\par \par Dr. Miranda Shabazz performed a lumpectomy and sentinel lymph node biopsy on 2018. Pathology reported infiltrating ductal carcinoma, [place] score 8/9, measuring 1.1 cm. HER-2/aiyana was equivocal by IHC and not amplified by FISH. There was also DCIS, solid and cribriform types with focal minimal necrosis and intermediate nuclear grade in the specimen. The surgical margins were negative. There were 6 negative left sentinel lymph nodes. Oncotype DX recurrence score was 37.\par \par The patient had an uneventful postoperative course. She was seen in medical oncology consultation at [facility] and presents here for an additional opinion regarding systemic adjuvant therapy.\par \par Risk factors: \par Prior breast disease: Benign cyst aspiration . Menarche: Age 12. Menopause: The patient underwent DANISH without oophorectomy in 2004. She has had vasomotor since then and had an estradiol level of less than 5 on 2018. The patient is  2 para 1011 with her childbirth at age 25. She was unable to breast feed her daughter. Oral contraceptive use: None. Hormone replacement therapy: None. Other hormone exposure: None. Topical estrogens: None.  Family history is positive for a maternal grandmother who possibly had breast cancer in her late 60s. There is no other family history of cancer. There is no personal or family history of colorectal neoplasia. The patient is of Ashkenazi Hindu background. The patient has not had genetic counseling or testing yet although she was scheduled for a genetics consultation at [facility]. [de-identified] : Infiltrating ductal carcinoma, Maria A score 8/9, ER positive, DC positive, HER-2/aiyana negative, Oncotype DX 37 [FreeTextEntry1] : CMF Q 2 weeks with Onpro, 3/16/2018 - 6/22/2018\par Anastrozole start 8/27/2018 [de-identified] : The patient is 1 year 1 month  post diagnosis of left breast cancer.\par \par The patient completed chemotherapy 7 months ago. \par \par Completed RT 5 months ago\par \par The patient has been on anastrozole for 5 months, aware of increased constipation x 1 month.\par \par Developed pain  left breast  LOQ radiating  to mid  axillary line  2 months ago,  states she had a CT Chest ordered by pulmonologist, no abnormal findings as per patient. States pain is constant, rated 7/10 at this time.\par \par The patient states she saw PCP 12/03/2018, exam with benign findings. She was not constipated at that time.\par \par No other interval event since last seen.\par \par \par

## 2019-01-04 NOTE — REASON FOR VISIT
[Follow-Up Visit] : a follow-up [Other: _____] : [unfilled] [FreeTextEntry2] : Infiltrating ductal carcinoma left breast

## 2019-03-29 ENCOUNTER — OUTPATIENT (OUTPATIENT)
Dept: OUTPATIENT SERVICES | Facility: HOSPITAL | Age: 59
LOS: 1 days | Discharge: HOME | End: 2019-03-29

## 2019-03-29 DIAGNOSIS — Z98.890 OTHER SPECIFIED POSTPROCEDURAL STATES: Chronic | ICD-10-CM

## 2019-03-29 DIAGNOSIS — Z90.710 ACQUIRED ABSENCE OF BOTH CERVIX AND UTERUS: Chronic | ICD-10-CM

## 2019-03-29 DIAGNOSIS — C50.912 MALIGNANT NEOPLASM OF UNSPECIFIED SITE OF LEFT FEMALE BREAST: ICD-10-CM

## 2019-04-05 ENCOUNTER — RESULT REVIEW (OUTPATIENT)
Age: 59
End: 2019-04-05

## 2019-04-05 ENCOUNTER — APPOINTMENT (OUTPATIENT)
Dept: HEMATOLOGY ONCOLOGY | Facility: CLINIC | Age: 59
End: 2019-04-05
Payer: COMMERCIAL

## 2019-04-05 VITALS
WEIGHT: 163.58 LBS | RESPIRATION RATE: 16 BRPM | TEMPERATURE: 97.7 F | OXYGEN SATURATION: 99 % | BODY MASS INDEX: 28.2 KG/M2 | DIASTOLIC BLOOD PRESSURE: 63 MMHG | HEART RATE: 76 BPM | SYSTOLIC BLOOD PRESSURE: 124 MMHG

## 2019-04-05 LAB
ERYTHROCYTE [SEDIMENTATION RATE] IN BLOOD: 14 MM/HR — SIGNIFICANT CHANGE UP (ref 0–20)
HCT VFR BLD CALC: 41.5 % — SIGNIFICANT CHANGE UP (ref 34.5–45)
HGB BLD-MCNC: 14.4 G/DL — SIGNIFICANT CHANGE UP (ref 11.5–15.5)
MCHC RBC-ENTMCNC: 31.4 PG — SIGNIFICANT CHANGE UP (ref 27–34)
MCHC RBC-ENTMCNC: 34.7 G/DL — SIGNIFICANT CHANGE UP (ref 32–36)
MCV RBC AUTO: 90.6 FL — SIGNIFICANT CHANGE UP (ref 80–100)
PLATELET # BLD AUTO: 336 K/UL — SIGNIFICANT CHANGE UP (ref 150–400)
RBC # BLD: 4.58 M/UL — SIGNIFICANT CHANGE UP (ref 3.8–5.2)
RBC # FLD: 11.7 % — SIGNIFICANT CHANGE UP (ref 10.3–14.5)
WBC # BLD: 5.1 K/UL — SIGNIFICANT CHANGE UP (ref 3.8–10.5)
WBC # FLD AUTO: 5.1 K/UL — SIGNIFICANT CHANGE UP (ref 3.8–10.5)

## 2019-04-05 PROCEDURE — 99215 OFFICE O/P EST HI 40 MIN: CPT

## 2019-04-05 NOTE — REVIEW OF SYSTEMS
[Constipation] : constipation [Negative] : Allergic/Immunologic [Wheezing] : no wheezing [Cough] : no cough [FreeTextEntry2] : Energy is good. Had  flu vaccination  10/12/2018 [FreeTextEntry3] : Blurry vision  has resolved.Unchanged  "serious floater and black [place]" since 01/2019, saw ophthalmologist 01/14/2019, was advised of no intervention but to continue to f/u. [FreeTextEntry5] : Most recent echo 1/1/2018 EF=>55% [FreeTextEntry6] : See interval history. [FreeTextEntry7] : Most recent colonoscopy 12/17/2017.Appetite is good. Constipation has  improved, see interval history.  [FreeTextEntry8] : s/p DANISH without oophorectomy. Most recent GYN exam 6/22/2018, exam stable, had pap and TVU. [FreeTextEntry9] : Most recent BMD 6/22/2018, osteopenia of femoral neck. [de-identified] : Difficulty staying asleep, no medical therapy indicated.

## 2019-04-05 NOTE — PHYSICAL EXAM
[Fully active, able to carry on all pre-disease performance without restriction] : Status 0 - Fully active, able to carry on all pre-disease performance without restriction [Normal] : grossly intact [de-identified] : No pitting pretibial edema, no calf tenderness. [de-identified] : Asymmetry, right breast larger than left. right breast; no mass. Left breast: scars at 6:00 and in axilla.Left breast mildly hyperpigmented and tender, no mass.. [de-identified] : Pfannenstiel incision [de-identified] : Diffuse nonpitting edema lateral malleoli both ankles.  [de-identified] : Calm

## 2019-04-05 NOTE — HISTORY OF PRESENT ILLNESS
[Disease: _____________________] : Disease: [unfilled] [T: ___] : T[unfilled] [N: ___] : N[unfilled] [M: ___] : M[unfilled] [AJCC Stage: ____] : AJCC Stage: [unfilled] [de-identified] : The patient presented in 2017, at age 57, with a left breast mass noted as an incidental finding on chest CT.\par \par The patient was referred by her pulmonologist for chest CT on 2017 and was noted to have a 12 mm left breast nodule. Mammogram and breast ultrasound were performed on 2017 and demonstrated a prepectoral suspicious irregularly shaped and marginated mass with pleomorphic calcifications. Ultrasound on the same date confirmed the presence of a 1.2 cm mass.\par \par [place] [facility] [place] the presence of a 1.2 x 0.9 cm mass. Ultrasound-guided core biopsy was performed on 2017 and was positive for poorly differentiated infiltrating ductal carcinoma which was estrogen receptor positive (greater than 99%), progesterone receptor positive (40%), HER-2/aiyana 2+ by IHC and not amplified by FISH. There was also DCIS present.\par \par Dr. Miranda Shabazz performed a lumpectomy and sentinel lymph node biopsy on 2018. Pathology reported infiltrating ductal carcinoma, [place] score 8/9, measuring 1.1 cm. HER-2/aiyana was equivocal by IHC and not amplified by FISH. There was also DCIS, solid and cribriform types with focal minimal necrosis and intermediate nuclear grade in the specimen. The surgical margins were negative. There were 6 negative left sentinel lymph nodes. Oncotype DX recurrence score was 37.\par \par The patient had an uneventful postoperative course. She was seen in medical oncology consultation [place] [facility] and presents here for an additional opinion regarding systemic adjuvant therapy.\par \par Risk factors: \par Prior breast disease: Benign cyst aspiration . Menarche: Age 12. Menopause: The patient underwent DANSIH without oophorectomy in 2004. She has had vasomotor since then and had an estradiol level of less than 5 on 2018. The patient is  2 para 1011 with her childbirth at age 25. She was unable to breast feed her daughter. Oral contraceptive use: None. Hormone replacement therapy: None. Other hormone exposure: None. Topical estrogens: None.  Family history is positive for a maternal grandmother who possibly had breast cancer in her late 60s. There is no other family history of cancer. There is no personal or family history of colorectal neoplasia. The patient is of Ashkenazi [place] background. The patient has not had genetic counseling or testing yet although she was scheduled for a genetics consultation at [facility]. [de-identified] : Infiltrating ductal carcinoma, Maria A score 8/9, ER positive, [place] positive, HER-2/aiyana negative, Oncotype DX 37 [FreeTextEntry1] : CMF Q 2 weeks with Onpro, 3/16/2018 - 6/22/2018\par Anastrozole start 8/27/2018 [de-identified] : The patient is 1 year 4  months   post diagnosis of left breast cancer.\par \par The patient completed chemotherapy 10  months ago. \par \par Completed RT 8 months ago\par \par The patient has been on anastrozole for 8  months, constipation decreased, now added more fibre to her food recipe.  Aware of increased joint pain after inactivity, ankles, feet, left hip. Pain in heels is new since starting anastrozole, had arthritis prior to starting anastrozole.\par \par Had  bilateral mammogram 06/09/2019, BI RADS 2.\par \par Pain in  left breast  LOQ radiating  to mid  axillary line since  11/2019 has decreased in frequency. No pain today.\par \par Was told by pulmonologist that lung function has slightly improved.\par \par The patient states she saw PCP 12/03/2018, for her yearly physical , exam with benign findings.\par \par No other interval event since last seen.\par \par \par

## 2019-04-07 LAB
ALBUMIN SERPL ELPH-MCNC: 4.7 G/DL
ALP BLD-CCNC: 95 U/L
ALT SERPL-CCNC: 26 U/L
ANION GAP SERPL CALC-SCNC: 14 MMOL/L
AST SERPL-CCNC: 26 U/L
BILIRUB SERPL-MCNC: 0.2 MG/DL
BUN SERPL-MCNC: 21 MG/DL
CALCIUM SERPL-MCNC: 9.9 MG/DL
CHLORIDE SERPL-SCNC: 102 MMOL/L
CO2 SERPL-SCNC: 28 MMOL/L
CREAT SERPL-MCNC: 1 MG/DL
GLUCOSE SERPL-MCNC: 91 MG/DL
POTASSIUM SERPL-SCNC: 4.3 MMOL/L
PROT SERPL-MCNC: 7.6 G/DL
RHEUMATOID FACT SER QL: <10 IU/ML
SODIUM SERPL-SCNC: 144 MMOL/L

## 2019-04-17 LAB — ANA SER IF-ACNC: NEGATIVE

## 2019-06-25 ENCOUNTER — RX RENEWAL (OUTPATIENT)
Age: 59
End: 2019-06-25

## 2019-07-10 ENCOUNTER — OUTPATIENT (OUTPATIENT)
Dept: OUTPATIENT SERVICES | Facility: HOSPITAL | Age: 59
LOS: 1 days | Discharge: HOME | End: 2019-07-10

## 2019-07-10 DIAGNOSIS — Z98.890 OTHER SPECIFIED POSTPROCEDURAL STATES: Chronic | ICD-10-CM

## 2019-07-10 DIAGNOSIS — Z90.710 ACQUIRED ABSENCE OF BOTH CERVIX AND UTERUS: Chronic | ICD-10-CM

## 2019-07-10 DIAGNOSIS — C50.912 MALIGNANT NEOPLASM OF UNSPECIFIED SITE OF LEFT FEMALE BREAST: ICD-10-CM

## 2019-07-11 ENCOUNTER — FORM ENCOUNTER (OUTPATIENT)
Age: 59
End: 2019-07-11

## 2019-07-12 ENCOUNTER — APPOINTMENT (OUTPATIENT)
Dept: HEMATOLOGY ONCOLOGY | Facility: CLINIC | Age: 59
End: 2019-07-12
Payer: COMMERCIAL

## 2019-07-12 ENCOUNTER — APPOINTMENT (OUTPATIENT)
Dept: RADIOLOGY | Facility: IMAGING CENTER | Age: 59
End: 2019-07-12
Payer: COMMERCIAL

## 2019-07-12 ENCOUNTER — OUTPATIENT (OUTPATIENT)
Dept: OUTPATIENT SERVICES | Facility: HOSPITAL | Age: 59
LOS: 1 days | End: 2019-07-12
Payer: COMMERCIAL

## 2019-07-12 ENCOUNTER — RESULT REVIEW (OUTPATIENT)
Age: 59
End: 2019-07-12

## 2019-07-12 VITALS
OXYGEN SATURATION: 100 % | WEIGHT: 162.7 LBS | BODY MASS INDEX: 28.05 KG/M2 | HEART RATE: 67 BPM | RESPIRATION RATE: 16 BRPM | DIASTOLIC BLOOD PRESSURE: 72 MMHG | SYSTOLIC BLOOD PRESSURE: 105 MMHG

## 2019-07-12 DIAGNOSIS — Z98.890 OTHER SPECIFIED POSTPROCEDURAL STATES: Chronic | ICD-10-CM

## 2019-07-12 DIAGNOSIS — Z79.899 OTHER LONG TERM (CURRENT) DRUG THERAPY: ICD-10-CM

## 2019-07-12 DIAGNOSIS — Z90.710 ACQUIRED ABSENCE OF BOTH CERVIX AND UTERUS: Chronic | ICD-10-CM

## 2019-07-12 LAB
HCT VFR BLD CALC: 41.9 % — SIGNIFICANT CHANGE UP (ref 34.5–45)
HGB BLD-MCNC: 14 G/DL — SIGNIFICANT CHANGE UP (ref 11.5–15.5)
MCHC RBC-ENTMCNC: 31.2 PG — SIGNIFICANT CHANGE UP (ref 27–34)
MCHC RBC-ENTMCNC: 33.4 G/DL — SIGNIFICANT CHANGE UP (ref 32–36)
MCV RBC AUTO: 93.3 FL — SIGNIFICANT CHANGE UP (ref 80–100)
PLATELET # BLD AUTO: 319 K/UL — SIGNIFICANT CHANGE UP (ref 150–400)
RBC # BLD: 4.49 M/UL — SIGNIFICANT CHANGE UP (ref 3.8–5.2)
RBC # FLD: 11.8 % — SIGNIFICANT CHANGE UP (ref 10.3–14.5)
WBC # BLD: 6.6 K/UL — SIGNIFICANT CHANGE UP (ref 3.8–10.5)
WBC # FLD AUTO: 6.6 K/UL — SIGNIFICANT CHANGE UP (ref 3.8–10.5)

## 2019-07-12 PROCEDURE — 99214 OFFICE O/P EST MOD 30 MIN: CPT

## 2019-07-12 PROCEDURE — 77080 DXA BONE DENSITY AXIAL: CPT

## 2019-07-12 PROCEDURE — 77080 DXA BONE DENSITY AXIAL: CPT | Mod: 26

## 2019-07-16 LAB
25(OH)D3 SERPL-MCNC: 35 NG/ML
ALBUMIN SERPL ELPH-MCNC: 4.4 G/DL
ALP BLD-CCNC: 91 U/L
ALT SERPL-CCNC: 22 U/L
ANION GAP SERPL CALC-SCNC: 12 MMOL/L
AST SERPL-CCNC: 20 U/L
BILIRUB SERPL-MCNC: 0.2 MG/DL
BUN SERPL-MCNC: 16 MG/DL
CALCIUM SERPL-MCNC: 9.6 MG/DL
CHLORIDE SERPL-SCNC: 101 MMOL/L
CO2 SERPL-SCNC: 29 MMOL/L
CREAT SERPL-MCNC: 0.97 MG/DL
GLUCOSE SERPL-MCNC: 93 MG/DL
POTASSIUM SERPL-SCNC: 4.4 MMOL/L
PROT SERPL-MCNC: 7 G/DL
SODIUM SERPL-SCNC: 142 MMOL/L

## 2019-07-22 NOTE — REVIEW OF SYSTEMS
[Constipation] : constipation [Negative] : Allergic/Immunologic [Wheezing] : no wheezing [Cough] : no cough [FreeTextEntry2] : Energy is good. Had  flu vaccination  10/12/2018 [FreeTextEntry5] : Most recent echo 1/1/2018 EF=>55% [FreeTextEntry3] : Blurry vision  has resolved.Unchanged  "serious floater and black [place]" since 01/2019, saw ophthalmologist 01/14/2019, was advised of no intervention but to continue to f/u. [FreeTextEntry6] : See interval history. [FreeTextEntry8] : s/p DANISH without oophorectomy. Most recent GYN exam 6/22/2018, exam stable, had pap and TVU. [FreeTextEntry7] : Most recent colonoscopy 12/17/2017.Appetite is good. Constipation resolved, see interval history.  [FreeTextEntry9] : Most recent BMD 6/22/2018, osteopenia of femoral neck. Had f/u BMD today. [de-identified] : Difficulty staying asleep, no medical therapy indicated.

## 2019-07-22 NOTE — HISTORY OF PRESENT ILLNESS
[Disease: _____________________] : Disease: [unfilled] [T: ___] : T[unfilled] [N: ___] : N[unfilled] [M: ___] : M[unfilled] [AJCC Stage: ____] : AJCC Stage: [unfilled] [de-identified] : The patient presented in 2017, at age 57, with a left breast mass noted as an incidental finding on chest CT.\par \par The patient was referred by her pulmonologist for chest CT on 2017 and was noted to have a 12 mm left breast nodule. Mammogram and breast ultrasound were performed on 2017 and demonstrated a prepectoral suspicious irregularly shaped and marginated mass with pleomorphic calcifications. Ultrasound on the same date confirmed the presence of a 1.2 cm mass.\par \par Repeat ultrasound in 2017 at [facility] confirmed the presence of a 1.2 x 0.9 cm mass. Ultrasound-guided core biopsy was performed on 2017 and was positive for poorly differentiated infiltrating ductal carcinoma which was estrogen receptor positive (greater than 99%), progesterone receptor positive (40%), HER-2/aiyana 2+ by IHC and not amplified by FISH. There was also DCIS present.\par \par Dr. Miranda Shabazz performed a lumpectomy and sentinel lymph node biopsy on 2018. Pathology reported infiltrating ductal carcinoma, [place] score 8/9, measuring 1.1 cm. HER-2/aiyana was equivocal by IHC and not amplified by FISH. There was also DCIS, solid and cribriform types with focal minimal necrosis and intermediate nuclear grade in the specimen. The surgical margins were negative. There were 6 negative left sentinel lymph nodes. Oncotype DX recurrence score was 37.\par \par The patient had an uneventful postoperative course. She was seen in medical oncology consultation at [facility] and presents here for an additional opinion regarding systemic adjuvant therapy.\par \par Risk factors: \par Prior breast disease: Benign cyst aspiration . Menarche: Age 12. Menopause: The patient underwent DANISH without oophorectomy in 2004. She has had vasomotor since then and had an estradiol level of less than 5 on 2018. The patient is  2 para 1011 with her childbirth at age 25. She was unable to breast feed her daughter. Oral contraceptive use: None. Hormone replacement therapy: None. Other hormone exposure: None. Topical estrogens: None.  Family history is positive for a maternal grandmother who possibly had breast cancer in her late 60s. There is no other family history of cancer. There is no personal or family history of colorectal neoplasia. The patient is of Ashkenazi Rastafarian background. The patient has not had genetic counseling or testing yet although she was scheduled for a genetics consultation at [facility]. [de-identified] : Infiltrating ductal carcinoma, Maria A score 8/9, ER positive, [place] positive, HER-2/aiyana negative, Oncotype DX 37 [FreeTextEntry1] : CMF Q 2 weeks with Onpro, 3/16/2018 - 6/22/2018\par Anastrozole start 8/27/2018 [de-identified] : The patient is 1 year 7  months  post diagnosis of left breast cancer.\par \par The patient completed chemotherapy 1 year 1 month  ago. \par \par Completed RT 11 months ago.\par \par The patient has been on anastrozole for 11  months, has occasional  joint pain in ankles, feet, left hip and heels after inactivity. Had arthritis prior to starting anastrozole which she agrees it's a contributory factor to her pain.No longer has constipation \par \par Had vonda colored stools x 3 day and it resolves. Saw GI  Yayo Donaldson 7/8/2018 and was advised to return for f/u  if any recurrence.\par \par Had  bilateral mammogram 01/09/2019, BI RADS 2.\par \par Pain in  left breast  LOQ radiating  to mid  axillary line since  11/2019 has decreased in frequency. No pain today.\par \par Was told by pulmonologist 04/2019 that lung function has slightly improved. Next f/u 10/2019\par \par The patient states she saw PCP 12/03/2018, for her yearly physical , exam with benign findings. Saw PCP  3 weeks ago for acute chest congestion, was treated with Z pack.\par \par The patient states she was in [place] on a 3 weeks vacation, back to [place] 06/30/2019.\par \par Overall  the patient state she feel well since last seen.\par \par No other interval event since last seen.\par \par \par

## 2019-07-22 NOTE — PHYSICAL EXAM
[Fully active, able to carry on all pre-disease performance without restriction] : Status 0 - Fully active, able to carry on all pre-disease performance without restriction [Normal] : grossly intact [de-identified] : No pitting pretibial edema, no calf tenderness. [de-identified] : Asymmetry, right breast larger than left. right breast; no mass. Left breast: scars at 6:00 and in axilla.Left breast mildly hyperpigmented and tender, no mass.. [de-identified] : Pfannenstiel incision [de-identified] : Diffuse nonpitting edema lateral malleoli both ankles.  [de-identified] : Calm

## 2019-10-10 ENCOUNTER — OUTPATIENT (OUTPATIENT)
Dept: OUTPATIENT SERVICES | Facility: HOSPITAL | Age: 59
LOS: 1 days | Discharge: HOME | End: 2019-10-10

## 2019-10-10 DIAGNOSIS — Z98.890 OTHER SPECIFIED POSTPROCEDURAL STATES: Chronic | ICD-10-CM

## 2019-10-10 DIAGNOSIS — Z90.710 ACQUIRED ABSENCE OF BOTH CERVIX AND UTERUS: Chronic | ICD-10-CM

## 2019-10-10 DIAGNOSIS — C50.912 MALIGNANT NEOPLASM OF UNSPECIFIED SITE OF LEFT FEMALE BREAST: ICD-10-CM

## 2019-10-11 ENCOUNTER — APPOINTMENT (OUTPATIENT)
Dept: HEMATOLOGY ONCOLOGY | Facility: CLINIC | Age: 59
End: 2019-10-11
Payer: COMMERCIAL

## 2019-10-11 VITALS
DIASTOLIC BLOOD PRESSURE: 74 MMHG | HEART RATE: 78 BPM | BODY MASS INDEX: 28.81 KG/M2 | RESPIRATION RATE: 17 BRPM | OXYGEN SATURATION: 100 % | SYSTOLIC BLOOD PRESSURE: 117 MMHG | TEMPERATURE: 98.4 F | WEIGHT: 167.11 LBS

## 2019-10-11 DIAGNOSIS — Z71.83 ENCOUNTER FOR NONPROCREATIVE GENETIC COUNSELING: ICD-10-CM

## 2019-10-11 DIAGNOSIS — R92.2 INCONCLUSIVE MAMMOGRAM: ICD-10-CM

## 2019-10-11 DIAGNOSIS — M25.50 PAIN IN UNSPECIFIED JOINT: ICD-10-CM

## 2019-10-11 PROCEDURE — 99215 OFFICE O/P EST HI 40 MIN: CPT

## 2019-10-11 RX ORDER — ACETAMINOPHEN 325 MG
5000 TABLET ORAL
Refills: 0 | Status: ACTIVE | COMMUNITY

## 2019-11-08 ENCOUNTER — MESSAGE (OUTPATIENT)
Age: 59
End: 2019-11-08

## 2019-12-12 ENCOUNTER — OUTPATIENT (OUTPATIENT)
Dept: OUTPATIENT SERVICES | Facility: HOSPITAL | Age: 59
LOS: 1 days | Discharge: HOME | End: 2019-12-12

## 2019-12-12 DIAGNOSIS — Z98.890 OTHER SPECIFIED POSTPROCEDURAL STATES: Chronic | ICD-10-CM

## 2019-12-12 DIAGNOSIS — Z90.710 ACQUIRED ABSENCE OF BOTH CERVIX AND UTERUS: Chronic | ICD-10-CM

## 2019-12-12 DIAGNOSIS — C50.912 MALIGNANT NEOPLASM OF UNSPECIFIED SITE OF LEFT FEMALE BREAST: ICD-10-CM

## 2019-12-13 ENCOUNTER — APPOINTMENT (OUTPATIENT)
Dept: HEMATOLOGY ONCOLOGY | Facility: CLINIC | Age: 59
End: 2019-12-13

## 2020-01-09 ENCOUNTER — APPOINTMENT (OUTPATIENT)
Dept: HEMATOLOGY ONCOLOGY | Facility: CLINIC | Age: 60
End: 2020-01-09

## 2020-01-10 ENCOUNTER — APPOINTMENT (OUTPATIENT)
Dept: HEMATOLOGY ONCOLOGY | Facility: CLINIC | Age: 60
End: 2020-01-10

## 2020-04-15 ENCOUNTER — OUTPATIENT (OUTPATIENT)
Dept: OUTPATIENT SERVICES | Facility: HOSPITAL | Age: 60
LOS: 1 days | Discharge: HOME | End: 2020-04-15

## 2020-04-15 DIAGNOSIS — Z98.890 OTHER SPECIFIED POSTPROCEDURAL STATES: Chronic | ICD-10-CM

## 2020-04-15 DIAGNOSIS — Z90.710 ACQUIRED ABSENCE OF BOTH CERVIX AND UTERUS: Chronic | ICD-10-CM

## 2020-04-15 DIAGNOSIS — C50.919 MALIGNANT NEOPLASM OF UNSPECIFIED SITE OF UNSPECIFIED FEMALE BREAST: ICD-10-CM

## 2020-06-15 ENCOUNTER — OUTPATIENT (OUTPATIENT)
Dept: OUTPATIENT SERVICES | Facility: HOSPITAL | Age: 60
LOS: 1 days | Discharge: HOME | End: 2020-06-15

## 2020-06-15 DIAGNOSIS — C50.912 MALIGNANT NEOPLASM OF UNSPECIFIED SITE OF LEFT FEMALE BREAST: ICD-10-CM

## 2020-06-15 DIAGNOSIS — Z90.710 ACQUIRED ABSENCE OF BOTH CERVIX AND UTERUS: Chronic | ICD-10-CM

## 2020-06-15 DIAGNOSIS — Z98.890 OTHER SPECIFIED POSTPROCEDURAL STATES: Chronic | ICD-10-CM

## 2020-06-18 ENCOUNTER — APPOINTMENT (OUTPATIENT)
Dept: HEMATOLOGY ONCOLOGY | Facility: CLINIC | Age: 60
End: 2020-06-18
Payer: COMMERCIAL

## 2020-06-18 PROCEDURE — 99214 OFFICE O/P EST MOD 30 MIN: CPT | Mod: 95

## 2020-06-28 NOTE — HISTORY OF PRESENT ILLNESS
[Disease: _____________________] : Disease: [unfilled] [T: ___] : T[unfilled] [N: ___] : N[unfilled] [M: ___] : M[unfilled] [AJCC Stage: ____] : AJCC Stage: [unfilled] [de-identified] : The patient presented in 2017, at age 57, with a left breast mass noted as an incidental finding on chest CT.\par \par The patient was referred by her pulmonologist for chest CT on 2017 and was noted to have a 12 mm left breast nodule. Mammogram and breast ultrasound were performed on 2017 and demonstrated a prepectoral suspicious irregularly shaped and marginated mass with pleomorphic calcifications. Ultrasound on the same date confirmed the presence of a 1.2 cm mass.\par \par Repeat ultrasound in 2017 at [facility] confirmed the presence of a 1.2 x 0.9 cm mass. Ultrasound-guided core biopsy was performed on 2017 and was positive for poorly differentiated infiltrating ductal carcinoma which was estrogen receptor positive (greater than 99%), progesterone receptor positive (40%), HER-2/aiyana 2+ by IHC and not amplified by FISH. There was also DCIS present.\par \par Dr. Miranda Shabazz performed a lumpectomy and sentinel lymph node biopsy on 2018. Pathology reported infiltrating ductal carcinoma, [place] score 8/9, measuring 1.1 cm. HER-2/aiyana was equivocal by IHC and not amplified by FISH. There was also DCIS, solid and cribriform types with focal minimal necrosis and intermediate nuclear grade in the specimen. The surgical margins were negative. There were 6 negative left sentinel lymph nodes. Oncotype DX recurrence score was 37.\par \par The patient had an uneventful postoperative course. She was seen in medical oncology consultation at [facility] and presents here for an additional opinion regarding systemic adjuvant therapy.\par \par Risk factors: \par Prior breast disease: Benign cyst aspiration . Menarche: Age 12. Menopause: The patient underwent DANISH without oophorectomy in 2004. She has had vasomotor since then and had an estradiol level of less than 5 on 2018. The patient is  2 para 1011 with her childbirth at age 25. She was unable to breast feed her daughter. Oral contraceptive use: None. Hormone replacement therapy: None. Other hormone exposure: None. Topical estrogens: None.  Family history is positive for a maternal grandmother who possibly had breast cancer in her late 60s. There is no other family history of cancer. There is no personal or family history of colorectal neoplasia. The patient is of Ashkenazi Gnosticism background. The patient has not had genetic counseling or testing although she was advised to do so multiple times. [de-identified] : Infiltrating ductal carcinoma, Maria A score 8/9, ER positive, [place] positive, HER-2/aiyana negative, Oncotype DX 37 [FreeTextEntry1] : CMF Q 2 weeks with Onpro, 3/16/2018 - 6/22/2018\par Anastrozole start 8/27/2018 [de-identified] : The patient is 2 years 6 months y  post diagnosis of left breast cancer.\par \par The patient completed chemotherapy 2 years  ago. \par \par The patient has been on anastrozole for 1  year 10  months, improved  occasional  joint pain in ankles, feet, left hip and heels after inactivity. Had arthritis prior to starting anastrozole which she agrees is a contributory factor to her pain.No longer has constipation \par \par  Saw GI  Yayo Donaldson 06/03/2020  for f/u migratory back pain radiating to her abdomen and had  f/u abdominal US, result benign. The patient states she had also had lab drawn 06/08/2020\par \par Had  bilateral mammogram 01/09/2019, BI RADS 2.The patient states she had f/u mammogram/Breast US at [facility] in the [facility] and she was advised of benign findings.Will call for report.\par \par Pain in  left breast  LOQ radiating  to mid  axillary line since  11/2019 has resolved. No pain today.\par \par Saw pulmonologist 10/2019  for f/u and management of lung function capacity, was advised function  has  improved.\par \par The patient states she saw PCP 12/03/2018, for her yearly physical , exam with benign findings. Last saw Saw PCP  06/2019  for acute chest congestion, was treated with Z pack.\par \par Most recent BMD 7/12/2019 demonstrated Femoral neck: -1.8, previously -2.1. Total hip: -0.5, previously -0.2 Spine. 0.7, previously 1.2,   Osteopenia consistent with low bone mass. The patient was  referred to endocrinologist Dr Felder 07/13/2018,   patient states she declined Prolia or Boniva when offered. The patient states she is still  not willing to continue to take Prolia nor \par \par Had initial evaluation with cardiologist 10/07/2019 for f/u "calcification around her heart". The patient states she had f/u echocardiogram which was benign for calcifications around the heart. Next f/u 10/2020\par \par Overall  the patient state she feel well since last seen.\par \par No other interval event since last seen.\par \par \par

## 2020-06-28 NOTE — REVIEW OF SYSTEMS
[Constipation] : constipation [Negative] : Allergic/Immunologic [Wheezing] : no wheezing [Cough] : no cough [FreeTextEntry2] : Energy is good. Had  flu vaccination 10/7/2019 [FreeTextEntry3] : Blurry vision  has resolved.Unchanged  "serious floater and black [place]" since 01/2019, saw ophthalmologist 01/14/2019, was advised of no intervention but to continue to f/u. [FreeTextEntry5] : Most recent echo 1/1/2018 EF=>55%. See interval history [FreeTextEntry6] : See interval history. [FreeTextEntry7] : Most recent colonoscopy 12/17/2017.Appetite is good. Constipation resolved, see interval history.  [FreeTextEntry8] : S/P DANISH without oophorectomy. Most recent GYN exam 02/06/2020, exam stable, had pap and TVU. [FreeTextEntry9] : Most recent BMD 7/12/2019, osteopenia of femoral neck.  Arthralgias are migratory (hips elbows, heels). [de-identified] : Difficulty staying asleep, no medical therapy indicated.

## 2020-06-28 NOTE — ASSESSMENT
[With Patient/Caregiver] : : With Patient/Caregiver [AdvancecareDate] : 10/11/2019 [FreeTextEntry2] : We discussed the need for a health care proxy and advance directives on 10/11/2019. We discussed possible appropriate individuals to serve as her proxy.  She was given a health care proxy form to review and she will discuss further with her family.\par Will continue the conversation next visit.\par \par

## 2020-12-15 ENCOUNTER — APPOINTMENT (OUTPATIENT)
Dept: HEMATOLOGY ONCOLOGY | Facility: CLINIC | Age: 60
End: 2020-12-15

## 2021-06-10 ENCOUNTER — OUTPATIENT (OUTPATIENT)
Dept: OUTPATIENT SERVICES | Facility: HOSPITAL | Age: 61
LOS: 1 days | Discharge: HOME | End: 2021-06-10

## 2021-06-10 DIAGNOSIS — C50.912 MALIGNANT NEOPLASM OF UNSPECIFIED SITE OF LEFT FEMALE BREAST: ICD-10-CM

## 2021-06-10 DIAGNOSIS — Z90.710 ACQUIRED ABSENCE OF BOTH CERVIX AND UTERUS: Chronic | ICD-10-CM

## 2021-06-10 DIAGNOSIS — Z98.890 OTHER SPECIFIED POSTPROCEDURAL STATES: Chronic | ICD-10-CM

## 2021-06-11 ENCOUNTER — RESULT REVIEW (OUTPATIENT)
Age: 61
End: 2021-06-11

## 2021-06-11 ENCOUNTER — APPOINTMENT (OUTPATIENT)
Dept: HEMATOLOGY ONCOLOGY | Facility: CLINIC | Age: 61
End: 2021-06-11
Payer: COMMERCIAL

## 2021-06-11 VITALS
DIASTOLIC BLOOD PRESSURE: 75 MMHG | OXYGEN SATURATION: 98 % | BODY MASS INDEX: 27.44 KG/M2 | HEART RATE: 71 BPM | RESPIRATION RATE: 16 BRPM | SYSTOLIC BLOOD PRESSURE: 113 MMHG | HEIGHT: 64.57 IN | WEIGHT: 162.7 LBS | TEMPERATURE: 98.1 F

## 2021-06-11 DIAGNOSIS — R14.0 ABDOMINAL DISTENSION (GASEOUS): ICD-10-CM

## 2021-06-11 DIAGNOSIS — R09.3 ABNORMAL SPUTUM: ICD-10-CM

## 2021-06-11 DIAGNOSIS — M85.80 OTHER SPECIFIED DISORDERS OF BONE DENSITY AND STRUCTURE, UNSPECIFIED SITE: ICD-10-CM

## 2021-06-11 DIAGNOSIS — R10.9 ABDOMINAL DISTENSION (GASEOUS): ICD-10-CM

## 2021-06-11 LAB
25(OH)D3 SERPL-MCNC: 38.6 NG/ML
ALBUMIN SERPL ELPH-MCNC: 4.4 G/DL
ALP BLD-CCNC: 85 U/L
ALT SERPL-CCNC: 21 U/L
ANION GAP SERPL CALC-SCNC: 13 MMOL/L
AST SERPL-CCNC: 25 U/L
BASOPHILS # BLD AUTO: 0.02 K/UL — SIGNIFICANT CHANGE UP (ref 0–0.2)
BASOPHILS NFR BLD AUTO: 0.5 % — SIGNIFICANT CHANGE UP (ref 0–2)
BILIRUB SERPL-MCNC: 0.2 MG/DL
BUN SERPL-MCNC: 27 MG/DL
CALCIUM SERPL-MCNC: 9.3 MG/DL
CHLORIDE SERPL-SCNC: 103 MMOL/L
CHOLEST SERPL-MCNC: 166 MG/DL
CO2 SERPL-SCNC: 27 MMOL/L
CREAT SERPL-MCNC: 1.07 MG/DL
EOSINOPHIL # BLD AUTO: 0.14 K/UL — SIGNIFICANT CHANGE UP (ref 0–0.5)
EOSINOPHIL NFR BLD AUTO: 3.3 % — SIGNIFICANT CHANGE UP (ref 0–6)
GLUCOSE SERPL-MCNC: 104 MG/DL
HCT VFR BLD CALC: 43.6 % — SIGNIFICANT CHANGE UP (ref 34.5–45)
HDLC SERPL-MCNC: 54 MG/DL
HGB BLD-MCNC: 14.4 G/DL — SIGNIFICANT CHANGE UP (ref 11.5–15.5)
IMM GRANULOCYTES NFR BLD AUTO: 0.5 % — SIGNIFICANT CHANGE UP (ref 0–1.5)
LDLC SERPL CALC-MCNC: 87 MG/DL
LYMPHOCYTES # BLD AUTO: 0.78 K/UL — LOW (ref 1–3.3)
LYMPHOCYTES # BLD AUTO: 18.6 % — SIGNIFICANT CHANGE UP (ref 13–44)
MCHC RBC-ENTMCNC: 30.6 PG — SIGNIFICANT CHANGE UP (ref 27–34)
MCHC RBC-ENTMCNC: 33 G/DL — SIGNIFICANT CHANGE UP (ref 32–36)
MCV RBC AUTO: 92.8 FL — SIGNIFICANT CHANGE UP (ref 80–100)
MONOCYTES # BLD AUTO: 0.37 K/UL — SIGNIFICANT CHANGE UP (ref 0–0.9)
MONOCYTES NFR BLD AUTO: 8.8 % — SIGNIFICANT CHANGE UP (ref 2–14)
NEUTROPHILS # BLD AUTO: 2.87 K/UL — SIGNIFICANT CHANGE UP (ref 1.8–7.4)
NEUTROPHILS NFR BLD AUTO: 68.3 % — SIGNIFICANT CHANGE UP (ref 43–77)
NONHDLC SERPL-MCNC: 112 MG/DL
NRBC # BLD: 0 /100 WBCS — SIGNIFICANT CHANGE UP (ref 0–0)
PLATELET # BLD AUTO: 284 K/UL — SIGNIFICANT CHANGE UP (ref 150–400)
POTASSIUM SERPL-SCNC: 4.8 MMOL/L
PROT SERPL-MCNC: 7 G/DL
RBC # BLD: 4.7 M/UL — SIGNIFICANT CHANGE UP (ref 3.8–5.2)
RBC # FLD: 12.5 % — SIGNIFICANT CHANGE UP (ref 10.3–14.5)
SODIUM SERPL-SCNC: 142 MMOL/L
TRIGL SERPL-MCNC: 125 MG/DL
WBC # BLD: 4.2 K/UL — SIGNIFICANT CHANGE UP (ref 3.8–10.5)
WBC # FLD AUTO: 4.2 K/UL — SIGNIFICANT CHANGE UP (ref 3.8–10.5)

## 2021-06-11 PROCEDURE — 99214 OFFICE O/P EST MOD 30 MIN: CPT

## 2021-06-11 NOTE — ASSESSMENT
[FreeTextEntry1] : Stage I (AJCC 8 edition) poorly differentiated infiltrating ductal carcinoma left breast. [id number], Status post lumpectomy/sentinel lymph node biopsy (6 nodes), Status post CMF x 8, Status post RT\par \par -on anastrozole since 8/27/2018, tolerating it well.\par -No evidence of disease on physical exam\par -due for mammogram/breast US, ordered in [employer].\par -CT C/A/P for h/o COPD, dark sputum, abdominal bloating r/o disease; advised pt to fu with pulm ASAP\par -will get labs today\par  [FreeTextEntry2] : \par

## 2021-06-11 NOTE — PHYSICAL EXAM
[Fully active, able to carry on all pre-disease performance without restriction] : Status 0 - Fully active, able to carry on all pre-disease performance without restriction [Normal] : affect appropriate [de-identified] : right breast; no mass. Left breast: scars at 6:00 and in axilla.Left breast mildly hyperpigmented and tender, no mass.. \par right breast; no mass. Left breast: scars at 6:00 and in axilla.Left breast mildly hyperpigmented and tender, no mass.. \par  Left breast: scars at 6:00 and in axilla, no mass; right breast no mass; no axillary LAD b/l

## 2021-06-11 NOTE — HISTORY OF PRESENT ILLNESS
[Disease: _____________________] : Disease: [unfilled] [T: ___] : T[unfilled] [N: ___] : N[unfilled] [M: ___] : M[unfilled] [AJCC Stage: ____] : AJCC Stage: [unfilled] [de-identified] : The patient presented in 2017, at age 57, with a left breast mass noted as an incidental finding on chest CT.\par \par The patient was referred by her pulmonologist for chest CT on 2017 for h/o COPD and was noted to have a 12 mm left breast nodule. Mammogram and breast ultrasound were performed on 2017 and demonstrated a prepectoral suspicious irregularly shaped and marginated mass with pleomorphic calcifications. Ultrasound on the same date confirmed the presence of a 1.2 cm mass.\par \par Repeat ultrasound in 2017 at [facility] confirmed the presence of a 1.2 x 0.9 cm mass. Ultrasound-guided core biopsy was performed on 2017 and was positive for poorly differentiated infiltrating ductal carcinoma which was estrogen receptor positive (greater than 99%), progesterone receptor positive (40%), HER-2/aiyana 2+ by IHC and not amplified by FISH. There was also DCIS present.\par \par Dr. Miranda Shabazz performed a left lumpectomy and sentinel lymph node biopsy on 2018. Pathology reported infiltrating ductal carcinoma, Maria A score 8/9, measuring 1.1 cm. HER-2/aiyana was equivocal by IHC and not amplified by FISH. There was also DCIS, solid and cribriform types with focal minimal necrosis and intermediate nuclear grade in the specimen. The surgical margins were negative. There were 6 negative left sentinel lymph nodes. \par \par Oncotype DX recurrence score was 37.\par \par The patient had an uneventful postoperative course. She was seen in medical oncology consultation at [facility] and presents here for an additional opinion regarding systemic adjuvant therapy.\par \par She underwent chemotherapy with CMF Q 2 weeks with Onpro, 3/16/2018 - 2018.\par \par Pt had radiation with Dr Bradley Bear 2018 at [facility]  2018.\par \par Anastrozole start 2018 to the present time.\par \par Risk factors: \par Prior breast disease: Benign cyst aspiration . Menarche: Age 12. Menopause: The patient underwent DANISH without oophorectomy in . She has had vasomotor since then and had an estradiol level of less than 5 on 2018. The patient is  2 para 1011 with her childbirth at age 25. She was unable to breast feed her daughter. Oral contraceptive use: None. Hormone replacement therapy: None. Other hormone exposure: None. Topical estrogens: None.  Family history is positive for a maternal grandmother who possibly had breast cancer in her late 60s. There is no other family history of cancer. There is no personal or family history of colorectal neoplasia. The patient is of Ashkenazi Zoroast[employer]ism background. Genetics testing negative, genetic counseling with Dr. Jamshid Faustin on 2020. [de-identified] : Infiltrating ductal carcinoma, Maria A score 8/9, ER positive, TN positive, HER-2/aiyana negative, Oncotype DX 37 [FreeTextEntry1] : CMF Q 2 weeks with Onpro, 3/16/2018 - 6/22/2018\par Anastrozole start 8/27/2018 [de-identified] : \par Anastrozole started 8/27/2018, tolerating it well, improved occasional joint pain/stiffness in ankles, feet, left hip and heels after inactivity. Had arthritis prior to starting anastrozole which she agrees is a contributory factor to her pain. Persistent hot flashes, only in head, no other areas. \par [place]amm[place] 1[place]13[place] [facility] Has not had repeat mammo/US this year.  Pt prefers to get her scans here moving forward.\par Reports intermittent grayish-black sputum x 2 mos, not dark blood.  No coughing, fevers, chest pain, SOB. \par Also c/o intermittent abd pain, associated with nausea and bloating x 2 mos.\par Labs today.\par Moving to [place] few months from now. \par \par HEALTH MAINTENANCE\par Last saw Saw PCP  06/2019  for acute chest congestion, was treated with Z pack.  COVID vaccine 4/21/21 PFizer.  Previous COVID infection in March 2020.  Weight loss. \par Unchanged  "serious floater and black [place]" since 01/2019, saw ophthalmologist 01/14/2019, was advised of no intervention but to continue to f/u.\par Saw pulmonologist 10/2019  for f/u COPD and management of lung function capacity, was advised function  has  improved.\par Had initial evaluation with cardiologist 10/07/2019 for f/u "calcification around her heart". The patient states she had f/u echocardiogram which was benign for calcifications around the heart. \par Saw GI  Yayo Donaldson 06/03/2020  for f/u migratory back pain radiating to her abdomen and had  f/u abdominal US, result benign. Most recent colonoscopy 12/17/2017.\par S/P DANISH without oophorectomy. Most recent GYN Onc Dr. Douglas exam 02/06/2020, exam stable, had pap and TVU.  Due now. No vaginal bleeding or spotting.\par Most recent BMD 7/12/2019 demonstrated Femoral neck: -1.8, previously -2.1. Total hip: -0.5, previously -0.2 Spine. 0.7, previously 1.2,   Osteopenia consistent with low bone mass. The patient was  referred to endocrinologist Dr Felder 07/13/2018,   patient states she declined Prolia or Boniva when offered. The patient states she is still  not willing to continue to take Prolia.

## 2021-06-11 NOTE — REVIEW OF SYSTEMS
[Constipation] : constipation [Wheezing] : no wheezing [Cough] : no cough [Negative] : Integumentary

## 2021-06-18 ENCOUNTER — RESULT REVIEW (OUTPATIENT)
Age: 61
End: 2021-06-18

## 2021-06-28 ENCOUNTER — NON-APPOINTMENT (OUTPATIENT)
Age: 61
End: 2021-06-28

## 2021-06-29 ENCOUNTER — APPOINTMENT (OUTPATIENT)
Dept: CT IMAGING | Facility: IMAGING CENTER | Age: 61
End: 2021-06-29
Payer: COMMERCIAL

## 2021-06-29 ENCOUNTER — OUTPATIENT (OUTPATIENT)
Dept: OUTPATIENT SERVICES | Facility: HOSPITAL | Age: 61
LOS: 1 days | End: 2021-06-29
Payer: COMMERCIAL

## 2021-06-29 DIAGNOSIS — R14.0 ABDOMINAL DISTENSION (GASEOUS): ICD-10-CM

## 2021-06-29 DIAGNOSIS — Z98.890 OTHER SPECIFIED POSTPROCEDURAL STATES: Chronic | ICD-10-CM

## 2021-06-29 DIAGNOSIS — Z90.710 ACQUIRED ABSENCE OF BOTH CERVIX AND UTERUS: Chronic | ICD-10-CM

## 2021-06-29 PROCEDURE — 70491 CT SOFT TISSUE NECK W/DYE: CPT | Mod: 26

## 2021-06-29 PROCEDURE — 70491 CT SOFT TISSUE NECK W/DYE: CPT

## 2021-06-29 PROCEDURE — 74177 CT ABD & PELVIS W/CONTRAST: CPT | Mod: 26

## 2021-06-29 PROCEDURE — 74177 CT ABD & PELVIS W/CONTRAST: CPT

## 2021-06-29 PROCEDURE — 71260 CT THORAX DX C+: CPT

## 2021-06-29 PROCEDURE — 71260 CT THORAX DX C+: CPT | Mod: 26

## 2021-06-30 ENCOUNTER — NON-APPOINTMENT (OUTPATIENT)
Age: 61
End: 2021-06-30

## 2021-07-01 ENCOUNTER — NON-APPOINTMENT (OUTPATIENT)
Age: 61
End: 2021-07-01

## 2021-07-02 ENCOUNTER — EMERGENCY (EMERGENCY)
Facility: HOSPITAL | Age: 61
LOS: 1 days | Discharge: LEFT BEFORE DISCHARGE | End: 2021-07-02
Attending: EMERGENCY MEDICINE | Admitting: EMERGENCY MEDICINE
Payer: COMMERCIAL

## 2021-07-02 VITALS
TEMPERATURE: 98 F | SYSTOLIC BLOOD PRESSURE: 120 MMHG | HEART RATE: 69 BPM | OXYGEN SATURATION: 100 % | RESPIRATION RATE: 18 BRPM | DIASTOLIC BLOOD PRESSURE: 79 MMHG

## 2021-07-02 VITALS
TEMPERATURE: 98 F | DIASTOLIC BLOOD PRESSURE: 75 MMHG | HEART RATE: 81 BPM | HEIGHT: 63.5 IN | SYSTOLIC BLOOD PRESSURE: 126 MMHG | OXYGEN SATURATION: 99 % | RESPIRATION RATE: 16 BRPM

## 2021-07-02 DIAGNOSIS — Z98.890 OTHER SPECIFIED POSTPROCEDURAL STATES: Chronic | ICD-10-CM

## 2021-07-02 DIAGNOSIS — G93.9 DISORDER OF BRAIN, UNSPECIFIED: ICD-10-CM

## 2021-07-02 DIAGNOSIS — Z90.710 ACQUIRED ABSENCE OF BOTH CERVIX AND UTERUS: Chronic | ICD-10-CM

## 2021-07-02 LAB
ALBUMIN SERPL ELPH-MCNC: 4.1 G/DL — SIGNIFICANT CHANGE UP (ref 3.3–5)
ALP SERPL-CCNC: 83 U/L — SIGNIFICANT CHANGE UP (ref 40–120)
ALT FLD-CCNC: 22 U/L — SIGNIFICANT CHANGE UP (ref 4–33)
ANION GAP SERPL CALC-SCNC: 10 MMOL/L — SIGNIFICANT CHANGE UP (ref 7–14)
AST SERPL-CCNC: 24 U/L — SIGNIFICANT CHANGE UP (ref 4–32)
BASOPHILS # BLD AUTO: 0.03 K/UL — SIGNIFICANT CHANGE UP (ref 0–0.2)
BASOPHILS NFR BLD AUTO: 0.6 % — SIGNIFICANT CHANGE UP (ref 0–2)
BILIRUB SERPL-MCNC: 0.2 MG/DL — SIGNIFICANT CHANGE UP (ref 0.2–1.2)
BUN SERPL-MCNC: 20 MG/DL — SIGNIFICANT CHANGE UP (ref 7–23)
CALCIUM SERPL-MCNC: 9.4 MG/DL — SIGNIFICANT CHANGE UP (ref 8.4–10.5)
CHLORIDE SERPL-SCNC: 104 MMOL/L — SIGNIFICANT CHANGE UP (ref 98–107)
CO2 SERPL-SCNC: 27 MMOL/L — SIGNIFICANT CHANGE UP (ref 22–31)
CREAT SERPL-MCNC: 1.01 MG/DL — SIGNIFICANT CHANGE UP (ref 0.5–1.3)
EOSINOPHIL # BLD AUTO: 0.12 K/UL — SIGNIFICANT CHANGE UP (ref 0–0.5)
EOSINOPHIL NFR BLD AUTO: 2.2 % — SIGNIFICANT CHANGE UP (ref 0–6)
GLUCOSE SERPL-MCNC: 114 MG/DL — HIGH (ref 70–99)
HCT VFR BLD CALC: 42.8 % — SIGNIFICANT CHANGE UP (ref 34.5–45)
HGB BLD-MCNC: 13.7 G/DL — SIGNIFICANT CHANGE UP (ref 11.5–15.5)
IANC: 3.93 K/UL — SIGNIFICANT CHANGE UP (ref 1.5–8.5)
IMM GRANULOCYTES NFR BLD AUTO: 0.6 % — SIGNIFICANT CHANGE UP (ref 0–1.5)
LYMPHOCYTES # BLD AUTO: 0.76 K/UL — LOW (ref 1–3.3)
LYMPHOCYTES # BLD AUTO: 14.2 % — SIGNIFICANT CHANGE UP (ref 13–44)
MCHC RBC-ENTMCNC: 30 PG — SIGNIFICANT CHANGE UP (ref 27–34)
MCHC RBC-ENTMCNC: 32 GM/DL — SIGNIFICANT CHANGE UP (ref 32–36)
MCV RBC AUTO: 93.9 FL — SIGNIFICANT CHANGE UP (ref 80–100)
MONOCYTES # BLD AUTO: 0.48 K/UL — SIGNIFICANT CHANGE UP (ref 0–0.9)
MONOCYTES NFR BLD AUTO: 9 % — SIGNIFICANT CHANGE UP (ref 2–14)
NEUTROPHILS # BLD AUTO: 3.93 K/UL — SIGNIFICANT CHANGE UP (ref 1.8–7.4)
NEUTROPHILS NFR BLD AUTO: 73.4 % — SIGNIFICANT CHANGE UP (ref 43–77)
NRBC # BLD: 0 /100 WBCS — SIGNIFICANT CHANGE UP
NRBC # FLD: 0 K/UL — SIGNIFICANT CHANGE UP
PLATELET # BLD AUTO: 274 K/UL — SIGNIFICANT CHANGE UP (ref 150–400)
POTASSIUM SERPL-MCNC: 4.4 MMOL/L — SIGNIFICANT CHANGE UP (ref 3.5–5.3)
POTASSIUM SERPL-SCNC: 4.4 MMOL/L — SIGNIFICANT CHANGE UP (ref 3.5–5.3)
PROT SERPL-MCNC: 6.9 G/DL — SIGNIFICANT CHANGE UP (ref 6–8.3)
RBC # BLD: 4.56 M/UL — SIGNIFICANT CHANGE UP (ref 3.8–5.2)
RBC # FLD: 12.7 % — SIGNIFICANT CHANGE UP (ref 10.3–14.5)
SARS-COV-2 RNA SPEC QL NAA+PROBE: SIGNIFICANT CHANGE UP
SODIUM SERPL-SCNC: 141 MMOL/L — SIGNIFICANT CHANGE UP (ref 135–145)
WBC # BLD: 5.35 K/UL — SIGNIFICANT CHANGE UP (ref 3.8–10.5)
WBC # FLD AUTO: 5.35 K/UL — SIGNIFICANT CHANGE UP (ref 3.8–10.5)

## 2021-07-02 PROCEDURE — 99282 EMERGENCY DEPT VISIT SF MDM: CPT

## 2021-07-02 PROCEDURE — 99285 EMERGENCY DEPT VISIT HI MDM: CPT

## 2021-07-02 PROCEDURE — 70450 CT HEAD/BRAIN W/O DYE: CPT | Mod: 26

## 2021-07-02 RX ORDER — DIAZEPAM 5 MG
5 TABLET ORAL ONCE
Refills: 0 | Status: DISCONTINUED | OUTPATIENT
Start: 2021-07-02 | End: 2021-07-02

## 2021-07-02 RX ORDER — DIAZEPAM 5 MG
1 TABLET ORAL
Qty: 5 | Refills: 0
Start: 2021-07-02 | End: 2021-07-06

## 2021-07-02 RX ORDER — DIAZEPAM 5 MG
1 TABLET ORAL
Qty: 6 | Refills: 0
Start: 2021-07-02 | End: 2021-07-07

## 2021-07-02 RX ADMIN — Medication 5 MILLIGRAM(S): at 16:00

## 2021-07-02 RX ADMIN — Medication 5 MILLIGRAM(S): at 08:52

## 2021-07-02 NOTE — ED PROVIDER NOTE - ATTENDING CONTRIBUTION TO CARE
Attending note:   After face to face evaluation of this patient, I concur with above noted hx, pe, and care plan for this patient.  Sharma: 61 yof with hx of breast cancer in remission complaining of neck pain for few weeks. Pt seen by PMD/onc and had CT chest/abd/pelvis and neck for black sputum and neck spasm. Pt told to come to ED for abnormal finding on right frontal lobe partially imaged on Ct neck. Pt denies headache, confusion, blurry vision, diff walking talking, numbness, weakness. Pt is well appearing, anxious, PERRL, EOMI, no facial asymmetry, clear lungs, decreased ROM of neck bilateral, poor dentition, no midline cervical tn, RRR, abd soft and non tender, neuro exam shows motor 5/5, no drift, sensation normal, gait normal.  Imaging reviewed with NS - will get CT head to fully image area and then MRI w and w/o of head and neck. Pain meds for neck spasm. Although mets is possible this could be a meningioma or other benign cause as well.

## 2021-07-02 NOTE — ED PROVIDER NOTE - OBJECTIVE STATEMENT
61F w/ PMHx of breast ca in remission since 2018, atherosclerotic dz, gastritis p/w neck pain for the last 3 weeks.  Pt. sees Dr. Carrie Cavanaugh at [facility] for oncology.  3 weeks ago, pt. also had dark black sputum after which she went to see Dr. Cavanaugh.  Pt. got a CT chest, abdomen, and pelvis, but decided to obtain CT neck as well given chronic neck pain.  CT neck showed an extraaxial enhancing mass in the right anterior cranial fossa.  Was advised by Dr. Cavanaugh to obtain a STAT MRI.  States she tried sleeping in different positions and made her own pillow to help w/ neck pain.  No vision changes.  Had chronic headaches in the past as she's never had good sleep in her life.

## 2021-07-02 NOTE — ED ADULT TRIAGE NOTE - CHIEF COMPLAINT QUOTE
Sent in by oncologist, MD Cavanaugh, for further imaging for new founding of brain mass on CT. Pt c/o neck pain, hx of COPD (no home O2), breast CA (oral chemo).

## 2021-07-02 NOTE — ED PROVIDER NOTE - PATIENT PORTAL LINK FT
You can access the FollowMyHealth Patient Portal offered by [facility] by registering at the following website: [email or web address]. By joining [employer]’s FollowMyHealth portal, you will also be able to view your health information using other applications (apps) compatible with our system.

## 2021-07-02 NOTE — ED PROVIDER NOTE - CLINICAL SUMMARY MEDICAL DECISION MAKING FREE TEXT BOX
61F p/w neck pain and MRI for a brain mass diagnosed on CT.  No vision changes or neurologic sxs.  Neuro exam unremarkable, VSS.  Will obtain basic labs, consult NSx and follow their recs for imaging.  Will administer valium for neck pain.  Will reassess and dispo pending results and consultant recs.

## 2021-07-02 NOTE — ED PROVIDER NOTE - NS ED ROS FT
General: denies fever, chills, weight loss/weight gain.  HENT: denies nasal congestion, sore throat, rhinorrhea, ear pain.  Eyes: denies visual changes, blurred vision, eye discharge, eye redness.  Neck: +neck pain; denies neck swelling.  CV: denies chest pain, palpitations.  Resp: denies difficulty breathing, cough.  Abdominal: denies nausea, vomiting, diarrhea, abdominal pain, blood in stool, dark stool.  MSK: denies muscle aches, bony pain, leg pain, leg swelling.  Neuro: denies headaches, numbness, tingling, dizziness, lightheadedness.  Skin: denies rashes, cuts, bruises.  Hematologic: denies unexplained bruises.

## 2021-07-02 NOTE — ED ADULT NURSE NOTE - NSIMPLEMENTINTERV_GEN_ALL_ED
Implemented All Universal Safety Interventions:  [place] to call system. Call bell, personal items and telephone within reach. Instruct patient to call for assistance. Room bathroom lighting operational. Non-slip footwear when patient is off stretcher. Physically safe environment: no spills, clutter or unnecessary equipment. Stretcher in lowest position, wheels locked, appropriate side rails in place.

## 2021-07-02 NOTE — CONSULT NOTE ADULT - SUBJECTIVE AND OBJECTIVE BOX
NEUROSURGERY CONSULT    HPI: 61y Female 61F w/ PMHx of breast ca in remission since 2018, atherosclerotic dz, gastritis p/w neck pain for the last 3 weeks.  Pt. sees Dr. Carrie Cavanaugh at [facility] for oncology.  3 weeks ago, pt. also had dark black sputum after which she went to see Dr. Cavanaugh.  Pt. got a CT chest, abdomen, and pelvis, but decided to obtain CT neck as well given chronic neck pain x 3 weeks.  CT neck showed an extraaxial enhancing mass in the right anterior cranial fossa.  Was advised by Dr. Cavanaugh to obtain a STAT MRI.  States she tried sleeping in different positions and made her own pillow to help w/ neck pain.  No vision changes.  Had chronic headaches in the past as she's never had good sleep in her life.    RADIOLOGY: IMPRESSION:  An ill-defined hyperattenuating extra-axial lesion is noted in the right cranial fossa at the right orbital roof, corresponding to enhancing lesion noted on prior CT soft tissue neck 6/29/2021. This is likely compatible with a meningioma though correlation with pending MR. is recommended.    IMPRESSION:    Incompletely visualized brain parenchyma with right anterior cranial fossa supraorbital enhancing 1.5 x 1.8 x 0.84 cm, AP, TR, CC extra-axial enhancing mass, in patient with breast cancer, strongly suggest MRI brain with gadolinium for improved assessment.    At C4-5  right paracentral disc herniation with calcification, right greater than left uncovertebral facet hypertrophy  narrow AP canal to 4.3 mm, flattening the cord with right greater than left foraminal narrowing (602:75, 3:248). C5-6 disc osteophyte ridge, ossification posterior longitudinal ligament,paramedian disc herniation with calcification flatten the cord, canal AP is 5.2 mm, with severe right greater than left foraminal narrowing, canal contents are suboptimally seen on CT, suggest MRI imaging cervical spine for improved assessment.    Prominent palatine and lingual tonsils with lingual tonsils extend into the vallecula, correlate visual inspection. Please see report of the chest for thoracic findings.    Dental disease periapical lucency, dehiscent maxillary and mandibular buccal cortical bony margins, strongly suggest dedicated dental inspection.      MEDS:      PHYSICAL EXAM: awake, A&Ox3, fc  perrl, tracts  FOLEY 5/5  SILT   no clonus    Vital Signs Last 24 Hrs  T(C): 36.1 (02 Jul 2021 09:23), Max: 36.6 (02 Jul 2021 07:57)  T(F): 97 (02 Jul 2021 09:23), Max: 97.9 (02 Jul 2021 07:57)  HR: 60 (02 Jul 2021 09:23) (60 - 81)  BP: 107/63 (02 Jul 2021 09:23) (107/63 - 126/75)  BP(mean): --  RR: 16 (02 Jul 2021 09:23) (16 - 16)  SpO2: 97% (02 Jul 2021 09:23) (97% - 99%)    LABS:                        13.7   5.35  )-----------( 274      ( 02 Jul 2021 09:24 )             42.8     07-02    141  |  104  |  20  ----------------------------<  114<H>  4.4   |  27  |  1.01    Ca    9.4      02 Jul 2021 09:24    TPro  6.9  /  Alb  4.1  /  TBili  0.2  /  DBili  x   /  AST  24  /  ALT  22  /  AlkPhos  83  07-02

## 2021-07-02 NOTE — ED ADULT NURSE NOTE - CAS DISCH ACCOMP BY
1. Have you had increased asthma symptoms (chest tightness, increased cough,         wheezing or felt short of breath) in the past week? No    2. Have you had a marked increase in allergy symptoms(itchy eyes or nose, sneezing, runny nose, post nasal drip or throat clearing) in the past week? No    3. Do you have a cold, respiratory tract infection, or flu-like symptoms? No    4. Did you have any problems such as increased allergy or asthma symptoms, hives or generalized itching within 12 hours of receiving your allergy injection or swelling that persisted into the next day? No    5. Are you on any new medications such as eye drops, blood pressure or migraine medication?  No    Please specify:     6. Patient was seen by allergist in the last 12 months?  Yes    7. Are you taking your allergy medicine as prescribed? Yes    8. Do you have your Epi kit with you? Yes    9. Epi expiration date: 1-12-20     Staff notes:  Patient waited 30 minutes after injection and had arm(s) checked by the nurse prior to leaving.            
Spouse

## 2021-07-02 NOTE — ED ADULT NURSE NOTE - OBJECTIVE STATEMENT
Patient alert and awake, oriented x4, breathing with ease on room air, skin intact, ambulates with steady gait. Patient with hx left breast CA, reports takes hormonal therapy medications, sent my MD due to mass found on CT brain, reports neck pain and stiffness. Patient sent for further eval for brain mass.

## 2021-07-02 NOTE — ED PROVIDER NOTE - PHYSICAL EXAMINATION
GENERAL: Patient awake alert NAD.  HEENT: NC/AT, Moist mucous membranes, PERRL, EOMI.  Neck: Extensive muscular hypertonicity over entire posterior neck, limited active and passive ROM 2/2 hypertonicity.  LUNGS: CTAB, no wheezes or crackles.  CARDIAC: RRR, no m/r/g.  ABDOMEN: Soft, NT, ND, No rebound, guarding.  EXT: No edema. No calf tenderness. CV 2+DP/PT bilaterally.  MSK: No pain with movement, no deformities.  NEURO: A&Ox3. Moving all extremities. CN 2-12 grossly intact, motor strength 5/5 in all four extremities, sensation intact. Finger-to-nose and rapid alternating movements wnl. Pt. ambulating w/o any gait abnormalities.  SKIN: Warm and dry. No rash.  PSYCH: Normal affect.

## 2021-07-02 NOTE — ED PROVIDER NOTE - NSFOLLOWUPINSTRUCTIONS_ED_ALL_ED_FT
You were seen for evaluation of a brain mass.    We were able to perform a CT, but unable to perform an MRI.  We are awaiting a call back from neurosurgery for a final plan, but you were unable to wait until then.    I am giving you a copy of all the results you obtained here.  Please obtain an MRI ASAP and call your oncologist and let her know of this situation.    I have prescribed you a medication for your neck pain.  Take this only as needed.    If you have any loss of consciousness, sudden onset vision loss, lose function of your arms or legs, or have ANY concerning symptoms, return immediately to the ED.

## 2021-07-02 NOTE — ED PROVIDER NOTE - PROGRESS NOTE DETAILS
Mitch PGY2 - Pt. went for an MRI but states she cannot tolerate it.  Was sent back.  Spoke w/ NSx who said they would prefer further neck imaging given pt. has osteophytes but will confirm w/ attg whether this needs to be done inpatient. Mitch PGY3 - Re-paged NSx as pt. does not want to stay past 6PM. Mitch PGY3 - Pt. went for an MRI but states she cannot tolerate it.  Was sent back.  Spoke w/ NSx who said they would prefer further neck imaging given pt. has osteophytes but will confirm w/ attg whether this needs to be done inpatient. Mitch PGY3 - Called NSx resident/PA back who states she was unable to reach the attg as he was operating at another hospital.  Unless the attg reaches back, NSx recs are inpatient admission and urgent MRI.  Pt. does not want to wait past 6PM.  I re-paged NSx 30 mins ago, but no response.  Will sign out AMA.  Described to pt. that she may need to stay for an urgent MRI as the brain mass or osteophytes in neck could worsen and become something more dangerous that could have permanent disability.  Pt. verbalized understanding.  Pt. states she has a friend in the radiology department at [place] who may be able to get her into their open MRI.  Pt. is of full cognition and does not want stay.  She understands all the risks of leaving, including and upto death and permanent disability.  Will sign out AMA.

## 2021-07-19 ENCOUNTER — APPOINTMENT (OUTPATIENT)
Dept: MRI IMAGING | Facility: CLINIC | Age: 61
End: 2021-07-19

## 2021-07-29 ENCOUNTER — APPOINTMENT (OUTPATIENT)
Dept: MRI IMAGING | Facility: IMAGING CENTER | Age: 61
End: 2021-07-29

## 2021-07-30 ENCOUNTER — TRANSCRIPTION ENCOUNTER (OUTPATIENT)
Age: 61
End: 2021-07-30

## 2021-08-16 ENCOUNTER — APPOINTMENT (OUTPATIENT)
Dept: MRI IMAGING | Facility: CLINIC | Age: 61
End: 2021-08-16

## 2021-08-16 ENCOUNTER — OUTPATIENT (OUTPATIENT)
Dept: OUTPATIENT SERVICES | Facility: HOSPITAL | Age: 61
LOS: 1 days | End: 2021-08-16
Payer: COMMERCIAL

## 2021-08-16 ENCOUNTER — APPOINTMENT (OUTPATIENT)
Dept: MRI IMAGING | Facility: CLINIC | Age: 61
End: 2021-08-16
Payer: COMMERCIAL

## 2021-08-16 DIAGNOSIS — Z90.710 ACQUIRED ABSENCE OF BOTH CERVIX AND UTERUS: Chronic | ICD-10-CM

## 2021-08-16 DIAGNOSIS — Z00.8 ENCOUNTER FOR OTHER GENERAL EXAMINATION: ICD-10-CM

## 2021-08-16 DIAGNOSIS — C50.912 MALIGNANT NEOPLASM OF UNSPECIFIED SITE OF LEFT FEMALE BREAST: ICD-10-CM

## 2021-08-16 DIAGNOSIS — Z98.890 OTHER SPECIFIED POSTPROCEDURAL STATES: Chronic | ICD-10-CM

## 2021-08-16 PROCEDURE — 72156 MRI NECK SPINE W/O & W/DYE: CPT

## 2021-08-16 PROCEDURE — 70553 MRI BRAIN STEM W/O & W/DYE: CPT

## 2021-08-16 PROCEDURE — 72156 MRI NECK SPINE W/O & W/DYE: CPT | Mod: 26

## 2021-08-16 PROCEDURE — A9585: CPT

## 2021-08-16 PROCEDURE — 70553 MRI BRAIN STEM W/O & W/DYE: CPT | Mod: 26

## 2021-08-17 ENCOUNTER — NON-APPOINTMENT (OUTPATIENT)
Age: 61
End: 2021-08-17

## 2021-08-19 ENCOUNTER — RESULT REVIEW (OUTPATIENT)
Age: 61
End: 2021-08-19

## 2021-08-19 ENCOUNTER — APPOINTMENT (OUTPATIENT)
Dept: MAMMOGRAPHY | Facility: IMAGING CENTER | Age: 61
End: 2021-08-19
Payer: COMMERCIAL

## 2021-08-19 ENCOUNTER — APPOINTMENT (OUTPATIENT)
Dept: ULTRASOUND IMAGING | Facility: IMAGING CENTER | Age: 61
End: 2021-08-19
Payer: COMMERCIAL

## 2021-08-19 ENCOUNTER — OUTPATIENT (OUTPATIENT)
Dept: OUTPATIENT SERVICES | Facility: HOSPITAL | Age: 61
LOS: 1 days | End: 2021-08-19
Payer: COMMERCIAL

## 2021-08-19 DIAGNOSIS — Z90.710 ACQUIRED ABSENCE OF BOTH CERVIX AND UTERUS: Chronic | ICD-10-CM

## 2021-08-19 DIAGNOSIS — C50.912 MALIGNANT NEOPLASM OF UNSPECIFIED SITE OF LEFT FEMALE BREAST: ICD-10-CM

## 2021-08-19 DIAGNOSIS — Z98.890 OTHER SPECIFIED POSTPROCEDURAL STATES: Chronic | ICD-10-CM

## 2021-08-19 PROCEDURE — G0279: CPT

## 2021-08-19 PROCEDURE — 77066 DX MAMMO INCL CAD BI: CPT | Mod: 26

## 2021-08-19 PROCEDURE — G0279: CPT | Mod: 26

## 2021-08-19 PROCEDURE — 76641 ULTRASOUND BREAST COMPLETE: CPT

## 2021-08-19 PROCEDURE — 76641 ULTRASOUND BREAST COMPLETE: CPT | Mod: 26,50

## 2021-08-19 PROCEDURE — 77066 DX MAMMO INCL CAD BI: CPT

## 2021-08-23 ENCOUNTER — APPOINTMENT (OUTPATIENT)
Dept: NEUROSURGERY | Facility: CLINIC | Age: 61
End: 2021-08-23
Payer: COMMERCIAL

## 2021-08-23 VITALS
DIASTOLIC BLOOD PRESSURE: 76 MMHG | WEIGHT: 158 LBS | BODY MASS INDEX: 26.98 KG/M2 | HEIGHT: 64 IN | TEMPERATURE: 97.6 F | SYSTOLIC BLOOD PRESSURE: 131 MMHG | OXYGEN SATURATION: 98 % | HEART RATE: 80 BPM

## 2021-08-23 PROCEDURE — 99204 OFFICE O/P NEW MOD 45 MIN: CPT

## 2021-08-27 ENCOUNTER — APPOINTMENT (OUTPATIENT)
Dept: HEMATOLOGY ONCOLOGY | Facility: CLINIC | Age: 61
End: 2021-08-27

## 2021-09-09 ENCOUNTER — APPOINTMENT (OUTPATIENT)
Dept: NUCLEAR MEDICINE | Facility: IMAGING CENTER | Age: 61
End: 2021-09-09
Payer: COMMERCIAL

## 2021-09-09 ENCOUNTER — OUTPATIENT (OUTPATIENT)
Dept: OUTPATIENT SERVICES | Facility: HOSPITAL | Age: 61
LOS: 1 days | End: 2021-09-09
Payer: COMMERCIAL

## 2021-09-09 DIAGNOSIS — Z90.710 ACQUIRED ABSENCE OF BOTH CERVIX AND UTERUS: Chronic | ICD-10-CM

## 2021-09-09 DIAGNOSIS — Z00.8 ENCOUNTER FOR OTHER GENERAL EXAMINATION: ICD-10-CM

## 2021-09-09 DIAGNOSIS — C50.912 MALIGNANT NEOPLASM OF UNSPECIFIED SITE OF LEFT FEMALE BREAST: ICD-10-CM

## 2021-09-09 DIAGNOSIS — Z98.890 OTHER SPECIFIED POSTPROCEDURAL STATES: Chronic | ICD-10-CM

## 2021-09-09 PROCEDURE — 78306 BONE IMAGING WHOLE BODY: CPT

## 2021-09-09 PROCEDURE — 78306 BONE IMAGING WHOLE BODY: CPT | Mod: 26

## 2021-09-09 PROCEDURE — A9561: CPT

## 2021-09-12 NOTE — REVIEW OF SYSTEMS
[Sore Throat] : sore throat [Shortness Of Breath] : shortness of breath [Cough] : cough [Joint Pain] : joint pain [Joint Swelling] : joint swelling [Negative] : Heme/Lymph [FreeTextEntry2] : Sweats [de-identified] : Headaches [FreeTextEntry9] : Muscle Pain

## 2021-09-12 NOTE — REASON FOR VISIT
[New Patient Visit] : a new patient visit [FreeTextEntry1] : recent diagnosis of intracranial meningioma and concerns over intense neck pain

## 2021-09-12 NOTE — CONSULT LETTER
[Dear  ___] : Dear  [unfilled], [Courtesy Letter:] : I had the pleasure of seeing your patient, [unfilled], in my office today. [Sincerely,] : Sincerely, [FreeTextEntry1] : This very pleasant 61-year-old woman presents today for evaluation of recently identified intracranial meningioma as well as degenerative cervical spondylosis associated with neck pain and walking instability.  The patient works as a .  The patient has a significant background history of a diagnosis of breast cancer in 2017.  A focal biopsy of the left breast identified ductal carcinoma in situ which was HER-2 positive, estrogen receptor positive, and progesterone receptor positive.  The patient was subsequently treated with a lumpectomy followed by chemotherapy and radiation.  The patient continues since 2018 on anastrozole maintenance.  The patient describes having had a fall on June 6, 2021 as a result of clumsiness.  Since that time she has had significant neck pain that radiates into the suboccipital region and both shoulders.  On routine evaluation by the patient's oncologist there was concerns regarding large meant of the cervical lymph nodes.  The patient did have some upper respiratory tract symptoms but out of appropriate concern the patient underwent a MRI scan of the head and neck region.  These have identified degenerative cervical spondylosis and an incidental intracranial meningioma.  She denies any intense focal headaches aside from those headaches which are related to neck pain and pressure.  The patient takes Advil on an as-needed basis.  Heat and ice provide intermittent temporary relief.\par \par I have reviewed directly with the patient her MRI images of the brain.  These reveal a 1cm enhancing dural based lesion in the right anterior skull base just above the orbit.  There is no compromise of the optic canal or the calvarium overlying the orbit.  The adjacent brain parenchyma does not show any edema or mass-effect.  The MRI scan of the cervical spine is very concerning for advanced degenerative spondylosis with large osteophyte and disc complexes with evidence of calcification impinging on the central canal and causing spinal cord compression particularly at C4-5 and C5-6.  There is large osteophytes and reversal of the patient's neck curvature.  There is also significant compromise of the exiting nerve roots at both levels.\par \par On examination the patient's cranial nerve examination is within normal parameters.  Pupils are equal and reactive to light.  There is no nystagmus.  Visual fields are full.  Face movement and sensation are symmetric and normal.  The tongue protrudes in the midline.  Voice quality and swallow mechanism are normal.  The patient's hearing is intact.  There is no pronator drift.  Romberg test is negative.  The patient does have fairly brisk reflexes in the upper extremities and lower extremities.  There is no clonus.  Ramsey sign is positive bilaterally.  The patient does indicate some diffuse numbness and tingling in both hands which is minor but important given the patient's finding of spinal cord compression.  Has intact strength especially of biceps, triceps, wrist extension, and hand intrinsics.  No fasciculations.  Patient's walking is stable at this point.\par \par I have discussed with the patient 2 separate issues.  First and foremost the finding of an incidental dural based lesion in the setting of a past history of HER-2 positive breast cancer must be cautiously observed for the possibility of of a metastasis.  However, given the size of the lesion and the fact that there is no adjacent brain parenchyma reaction it is my current impression that this represents an incidental finding of a benign meningioma.  It would be my recommendation that we would continue with close observation and have the patient undergo a further MRI scan in 1 to 2 months.  If this was metastasis we would expect progression.  A meningioma on the other hand would show at most 1 to 2 mm of growth on an annual basis and given the patient's overall condition it would be most appropriate for her to undergo a period of observation.  Surgery would be undertaken only in the setting of clearly defined growth over time.  Surgery or stereotactic radiosurgery would be appropriate modalities.\par \par The second issue of concern is more important and that is spinal cord compression due to advanced spondylosis with stenosis.  It is my impression that the patient has some clumsiness with her walking and it may have contributed to the patient's fall.  The patient is at imminent risk of spinal cord injury with further decelerating injury or collisions.  Have reviewed in general terms surgical approaches which could include a posterior laminectomy procedure, an anterior 2 level cervical discectomy with fusion or also possibly a C5 corpectomy with C4-C6 fusion.  It is my current impression that this would be the most appropriate approach to decompress the spinal cord, to correct the curvature of the spine, and to ensure good decompression of the exiting nerve roots.  We will review the patient's case at our spinal multi disciplinary conference.  Patient has indicated good understanding and would like to think further about her options and timing of surgery related to her busy work schedule.\par \par the patient has a diagnosis of bleeding disorder on her chart which will require evaluation before surgery \par \par Thank you for very kindly including me in the evaluation and treatment of your patient.  Please do not hesitate to contact me should you have any questions or concerns regarding this evaluation, the patient's diagnosis, or the recommendation for consideration of cervical decompression and fusion surgery. [FreeTextEntry3] : Daniel Flores MD, PhD, FRCPSC \par Attending Neurosurgeon \par  of Neurosurgery \par [facility] \par [address], [address]nd floor \[place] [address] \par Office: [phone] \par Fax: [phone]\par \par

## 2021-09-14 ENCOUNTER — APPOINTMENT (OUTPATIENT)
Dept: NEUROSURGERY | Facility: CLINIC | Age: 61
End: 2021-09-14
Payer: COMMERCIAL

## 2021-09-14 VITALS
HEART RATE: 70 BPM | SYSTOLIC BLOOD PRESSURE: 114 MMHG | HEIGHT: 64 IN | DIASTOLIC BLOOD PRESSURE: 76 MMHG | OXYGEN SATURATION: 98 % | WEIGHT: 158 LBS | BODY MASS INDEX: 26.98 KG/M2 | TEMPERATURE: 97.6 F
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
## 2021-09-14 DIAGNOSIS — Z86.2 PERSONAL HISTORY OF DISEASES OF THE BLOOD AND BLOOD-FORMING ORGANS AND CERTAIN DISORDERS INVOLVING THE IMMUNE MECHANISM: ICD-10-CM

## 2021-09-14 PROCEDURE — 99214 OFFICE O/P EST MOD 30 MIN: CPT

## 2021-09-14 NOTE — CONSULT LETTER
[Dear  ___] : Dear  [unfilled], [Courtesy Letter:] : I had the pleasure of seeing your patient, [unfilled], in my office today. [Sincerely,] : Sincerely, [FreeTextEntry1] : This very pleasant 61-year-old woman returns at a 3-week interval with her  for further discussion regarding her advanced cervical spondylosis with spinal cord compression.  You may recall the patient also has a significant past history of her positive, progesterone receptor positive, and estrogen receptor positive left breast cancer.  She is undergone surgery and chemotherapy with radiation.  She continues on Aricept maintenance.  The patient was involved in a trip and fall in June 2021.  This resulted in a significant increase in baseline neck and shoulder pain.  The patient has history of progressive osteoarthritis.  Investigations however revealed concerning advanced degenerative spondylosis with stenosis in the mid cervical spine.  MRI imaging shows spinal cord compression without myelomalacia.  The patient has been having progressive problems with clumsiness and dropping objects from her hands.  She denies any bowel or bladder dysfunction.  She has intense cervical driven headaches.  The patient's case has been reviewed by multidisciplinary spine group.  The patient has recently undergone a bone scan to rule out any metastatic bone disease.  The patient returns to discuss those findings and surgical treatment options.\par \par I have reviewed with the patient as well as her  the MRI scan, bone scan, and previous CT scan of the cervical spine.  There is advanced degenerative spondylotic changes with calcification of the disc within the canal at C4-5 and C5-6.  There is significant bone spurring.\par \par I have reviewed with the patient the discussion from our spine board group.  The overwhelming consensus recommendation was for an anterior cervical corpectomy at C5 in order to adequately decompress the calcified ligament and degenerative disc and bone spurs compressing the spinal cord.  This would be supplemented by an anterior body expandable cage and anterior cervical plate.\par \par There has been no change in the patient's neurologic examination since our previous assessment 3 weeks ago.\par \par Using surgical models as well as the patient's own images and hand-drawn clinical diagrams I have explained in detail the surgical technique of an anterior cervical corpectomy and fusion with expandable cage procedure.  We have reviewed the particular risks and concerns of this including those of persistent or new neurologic condition including reperfusion injury, hemorrhaging, failure of fusion or instability, progressive adjacent segment disease in the future, infection, and postoperative pain and recovery expectations.  The patient indicated good understanding and that all questions have been fully addressed.  Consent has been provided.  The patient will require medical clearance.  We will move forward with surgery at the soonest available time.\par \par Thank you for very kindly including me in the evaluation and treatment of your patient.  Please do not hesitate to contact me should you have any questions or concerns regarding the patient's diagnosis of severe degenerative spondylosis of the cervical spine with stenosis and spinal cord compression, or the recommendation for decompression with a C5 anterior corpectomy with fusion from C4-C6. [FreeTextEntry3] : Daniel Flores MD, PhD, FRCPSC \par Attending Neurosurgeon \par  of Neurosurgery \par [facility] \par [address], [address]nd floor \[place] [address] \par Office: [phone] \par Fax: [phone]\par \par

## 2021-09-15 PROBLEM — Z86.2 HISTORY OF COAGULATION DEFECT: Status: RESOLVED | Noted: 2018-03-06 | Resolved: 2021-09-15

## 2021-09-24 ENCOUNTER — OUTPATIENT (OUTPATIENT)
Dept: OUTPATIENT SERVICES | Facility: HOSPITAL | Age: 61
LOS: 1 days | End: 2021-09-24
Payer: COMMERCIAL

## 2021-09-24 VITALS
RESPIRATION RATE: 16 BRPM | DIASTOLIC BLOOD PRESSURE: 78 MMHG | HEART RATE: 64 BPM | TEMPERATURE: 97 F | HEIGHT: 63 IN | WEIGHT: 166.89 LBS | SYSTOLIC BLOOD PRESSURE: 134 MMHG

## 2021-09-24 DIAGNOSIS — M47.812 SPONDYLOSIS WITHOUT MYELOPATHY OR RADICULOPATHY, CERVICAL REGION: ICD-10-CM

## 2021-09-24 DIAGNOSIS — C50.919 MALIGNANT NEOPLASM OF UNSPECIFIED SITE OF UNSPECIFIED FEMALE BREAST: ICD-10-CM

## 2021-09-24 DIAGNOSIS — Z01.818 ENCOUNTER FOR OTHER PREPROCEDURAL EXAMINATION: ICD-10-CM

## 2021-09-24 DIAGNOSIS — Z90.710 ACQUIRED ABSENCE OF BOTH CERVIX AND UTERUS: Chronic | ICD-10-CM

## 2021-09-24 DIAGNOSIS — M48.02 SPINAL STENOSIS, CERVICAL REGION: ICD-10-CM

## 2021-09-24 DIAGNOSIS — Z98.890 OTHER SPECIFIED POSTPROCEDURAL STATES: Chronic | ICD-10-CM

## 2021-09-24 DIAGNOSIS — M54.2 CERVICALGIA: ICD-10-CM

## 2021-09-24 DIAGNOSIS — Z87.39 PERSONAL HISTORY OF OTHER DISEASES OF THE MUSCULOSKELETAL SYSTEM AND CONNECTIVE TISSUE: ICD-10-CM

## 2021-09-24 DIAGNOSIS — J44.1 CHRONIC OBSTRUCTIVE PULMONARY DISEASE WITH (ACUTE) EXACERBATION: ICD-10-CM

## 2021-09-24 LAB
ANION GAP SERPL CALC-SCNC: 6 MMOL/L — SIGNIFICANT CHANGE UP (ref 5–17)
APPEARANCE UR: CLEAR — SIGNIFICANT CHANGE UP
APTT BLD: 35 SEC — SIGNIFICANT CHANGE UP (ref 27.5–35.5)
BASOPHILS # BLD AUTO: 0.03 K/UL — SIGNIFICANT CHANGE UP (ref 0–0.2)
BASOPHILS NFR BLD AUTO: 0.5 % — SIGNIFICANT CHANGE UP (ref 0–2)
BILIRUB UR-MCNC: NEGATIVE — SIGNIFICANT CHANGE UP
BUN SERPL-MCNC: 17 MG/DL — SIGNIFICANT CHANGE UP (ref 7–23)
CALCIUM SERPL-MCNC: 8.7 MG/DL — SIGNIFICANT CHANGE UP (ref 8.5–10.1)
CHLORIDE SERPL-SCNC: 107 MMOL/L — SIGNIFICANT CHANGE UP (ref 96–108)
CO2 SERPL-SCNC: 28 MMOL/L — SIGNIFICANT CHANGE UP (ref 22–31)
COLOR SPEC: YELLOW — SIGNIFICANT CHANGE UP
CREAT SERPL-MCNC: 0.95 MG/DL — SIGNIFICANT CHANGE UP (ref 0.5–1.3)
DIFF PNL FLD: ABNORMAL
EOSINOPHIL # BLD AUTO: 0.13 K/UL — SIGNIFICANT CHANGE UP (ref 0–0.5)
EOSINOPHIL NFR BLD AUTO: 2.3 % — SIGNIFICANT CHANGE UP (ref 0–6)
GLUCOSE SERPL-MCNC: 91 MG/DL — SIGNIFICANT CHANGE UP (ref 70–99)
GLUCOSE UR QL: NEGATIVE MG/DL — SIGNIFICANT CHANGE UP
HCT VFR BLD CALC: 41.1 % — SIGNIFICANT CHANGE UP (ref 34.5–45)
HGB BLD-MCNC: 13.9 G/DL — SIGNIFICANT CHANGE UP (ref 11.5–15.5)
IMM GRANULOCYTES NFR BLD AUTO: 0.4 % — SIGNIFICANT CHANGE UP (ref 0–1.5)
INR BLD: 1.01 RATIO — SIGNIFICANT CHANGE UP (ref 0.88–1.16)
KETONES UR-MCNC: NEGATIVE — SIGNIFICANT CHANGE UP
LEUKOCYTE ESTERASE UR-ACNC: NEGATIVE — SIGNIFICANT CHANGE UP
LYMPHOCYTES # BLD AUTO: 1.28 K/UL — SIGNIFICANT CHANGE UP (ref 1–3.3)
LYMPHOCYTES # BLD AUTO: 22.8 % — SIGNIFICANT CHANGE UP (ref 13–44)
MCHC RBC-ENTMCNC: 31 PG — SIGNIFICANT CHANGE UP (ref 27–34)
MCHC RBC-ENTMCNC: 33.8 GM/DL — SIGNIFICANT CHANGE UP (ref 32–36)
MCV RBC AUTO: 91.5 FL — SIGNIFICANT CHANGE UP (ref 80–100)
MONOCYTES # BLD AUTO: 0.43 K/UL — SIGNIFICANT CHANGE UP (ref 0–0.9)
MONOCYTES NFR BLD AUTO: 7.7 % — SIGNIFICANT CHANGE UP (ref 2–14)
NEUTROPHILS # BLD AUTO: 3.73 K/UL — SIGNIFICANT CHANGE UP (ref 1.8–7.4)
NEUTROPHILS NFR BLD AUTO: 66.3 % — SIGNIFICANT CHANGE UP (ref 43–77)
NITRITE UR-MCNC: NEGATIVE — SIGNIFICANT CHANGE UP
PH UR: 6 — SIGNIFICANT CHANGE UP (ref 5–8)
PLATELET # BLD AUTO: 306 K/UL — SIGNIFICANT CHANGE UP (ref 150–400)
POTASSIUM SERPL-MCNC: 3.9 MMOL/L — SIGNIFICANT CHANGE UP (ref 3.5–5.3)
POTASSIUM SERPL-SCNC: 3.9 MMOL/L — SIGNIFICANT CHANGE UP (ref 3.5–5.3)
PROT UR-MCNC: NEGATIVE MG/DL — SIGNIFICANT CHANGE UP
PROTHROM AB SERPL-ACNC: 11.8 SEC — SIGNIFICANT CHANGE UP (ref 10.6–13.6)
RBC # BLD: 4.49 M/UL — SIGNIFICANT CHANGE UP (ref 3.8–5.2)
RBC # FLD: 12.6 % — SIGNIFICANT CHANGE UP (ref 10.3–14.5)
SODIUM SERPL-SCNC: 141 MMOL/L — SIGNIFICANT CHANGE UP (ref 135–145)
SP GR SPEC: 1.01 — SIGNIFICANT CHANGE UP (ref 1.01–1.02)
UROBILINOGEN FLD QL: NEGATIVE MG/DL — SIGNIFICANT CHANGE UP
WBC # BLD: 5.62 K/UL — SIGNIFICANT CHANGE UP (ref 3.8–10.5)
WBC # FLD AUTO: 5.62 K/UL — SIGNIFICANT CHANGE UP (ref 3.8–10.5)

## 2021-09-24 PROCEDURE — 36415 COLL VENOUS BLD VENIPUNCTURE: CPT

## 2021-09-24 PROCEDURE — 83036 HEMOGLOBIN GLYCOSYLATED A1C: CPT

## 2021-09-24 PROCEDURE — 87640 STAPH A DNA AMP PROBE: CPT

## 2021-09-24 PROCEDURE — G0463: CPT | Mod: 25

## 2021-09-24 PROCEDURE — 81001 URINALYSIS AUTO W/SCOPE: CPT

## 2021-09-24 PROCEDURE — 86850 RBC ANTIBODY SCREEN: CPT

## 2021-09-24 PROCEDURE — 85610 PROTHROMBIN TIME: CPT

## 2021-09-24 PROCEDURE — 87641 MR-STAPH DNA AMP PROBE: CPT

## 2021-09-24 PROCEDURE — 86901 BLOOD TYPING SEROLOGIC RH(D): CPT

## 2021-09-24 PROCEDURE — 93005 ELECTROCARDIOGRAM TRACING: CPT

## 2021-09-24 PROCEDURE — 85025 COMPLETE CBC W/AUTO DIFF WBC: CPT

## 2021-09-24 PROCEDURE — 85730 THROMBOPLASTIN TIME PARTIAL: CPT

## 2021-09-24 PROCEDURE — 93010 ELECTROCARDIOGRAM REPORT: CPT

## 2021-09-24 PROCEDURE — 86900 BLOOD TYPING SEROLOGIC ABO: CPT

## 2021-09-24 PROCEDURE — 80048 BASIC METABOLIC PNL TOTAL CA: CPT

## 2021-09-24 NOTE — H&P PST ADULT - PROBLEM SELECTOR PLAN 1
Pre op, mupirocin and Hibiclens instructions given and explained.  Avoid NSAIDs and OTC supplements.   Patient verbalized understanding  medical consult requested completed awaiting report   advised to self quarantine

## 2021-09-24 NOTE — H&P PST ADULT - ASSESSMENT
61 y/o female present to [facility] for scheduled cystoscopy, ureteroscopy with removal of ureteral stone 10/8/21.    CAPRINI SCORE    AGE RELATED RISK FACTORS                                                       MOBILITY RELATED FACTORS  [ ] Age 41-60 years                                            (1 Point)                  [ ] Bed rest                                                        (1 Point)  [x ] Age: 61-74 years                                           (2 Points)                [ ] Plaster cast                                                   (2 Points)  [ ] Age= 75 years                                              (3 Points)                 [ ] Bed bound for more than 72 hours                   (2 Points)    DISEASE RELATED RISK FACTORS                                               GENDER SPECIFIC FACTORS  [ ] Edema in the lower extremities                       (1 Point)                  [ ] Pregnancy                                                     (1 Point)  [ ] Varicose veins                                               (1 Point)                  [ ] Post-partum < 6 weeks                                   (1 Point)             [ ] BMI > 25 Kg/m2                                            (1 Point)                  [ ] Hormonal therapy  or oral contraception            (1 Point)                 [ ] Sepsis (in the previous month)                        (1 Point)                  [ ] History of pregnancy complications  [ ] Pneumonia or serious lung disease                                               [ ] Unexplained or recurrent                       (1 Point)           (in the previous month)                               (1 Point)  [ ] Abnormal pulmonary function test                     (1 Point)                 SURGERY RELATED RISK FACTORS  [ ] Acute myocardial infarction                              (1 Point)                 [ ]  Section                                            (1 Point)  [ ] Congestive heart failure (in the previous month)  (1 Point)                 [ ] Minor surgery                                                 (1 Point)   [ ] Inflammatory bowel disease                             (1 Point)                 [ ] Arthroscopic surgery                                        (2 Points)  [ ] Central venous access                                    (2 Points)                [ x] General surgery lasting more than 45 minutes   (2 Points)       [ ] Stroke (in the previous month)                          (5 Points)               [ ] Elective arthroplasty                                        (5 Points)            [ ] malignancy                                                             (2 points)                                                                                                                                 HEMATOLOGY RELATED FACTORS                                                 TRAUMA RELATED RISK FACTORS  [ ] Prior episodes of VTE                                     (3 Points)                 [ ] Fracture of the hip, pelvis, or leg                       (5 Points)  [ ] Positive family history for VTE                         (3 Points)                 [ ] Acute spinal cord injury (in the previous month)  (5 Points)  [ ] Prothrombin 21841 A                                      (3 Points)                 [ ] Paralysis  (less than 1 month)                          (5 Points)  [ ] Factor V Leiden                                             (3 Points)                 [ ] Multiple Trauma within 1 month                         (5 Points)  [ ] Lupus anticoagulants                                     (3 Points)                                                           [ ] Anticardiolipin antibodies                                (3 Points)                                                       [ ] High homocysteine in the blood                      (3 Points)                                             [ ] Other congenital or acquired thrombophilia       (3 Points)                                                [ ] Heparin induced thrombocytopenia                  (3 Points)                                          Total Score [    4      ]    The Caprini score indicates this patient is at risk for a VTE event (score 3-5).  Most surgical patients in this group would benefit from pharmacologic prophylaxis.  The surgical team will determine the balance between VTE risk and bleeding risk

## 2021-09-24 NOTE — H&P PST ADULT - HISTORY OF PRESENT ILLNESS
62 y/o female presents to PST for scheduled c5 anterior discotomy on 10/1/21. Patient reports [place] daughter she started to have neck pain. Patient states over the last 4 months pain has worsen and diagnostic testing done showing cervical stenosis.

## 2021-09-24 NOTE — H&P PST ADULT - NSICDXPASTMEDICALHX_GEN_ALL_CORE_FT
PAST MEDICAL HISTORY:  Breast cancer     COPD exacerbation     COVID-19 vaccine series completed pfizier 2nd dose 4/2021    Esophagitis     Gastritis     History of neck pain     Uterine polyp

## 2021-09-24 NOTE — H&P PST ADULT - ANESTHESIA, PREVIOUS REACTION, PROFILE
one episode of delayed awakening s/p hysterectomy 2004, Denies family hx of malignant hyperthermia/delayed awakening

## 2021-09-25 DIAGNOSIS — M48.02 SPINAL STENOSIS, CERVICAL REGION: ICD-10-CM

## 2021-09-25 DIAGNOSIS — Z01.818 ENCOUNTER FOR OTHER PREPROCEDURAL EXAMINATION: ICD-10-CM

## 2021-09-25 DIAGNOSIS — M47.812 SPONDYLOSIS WITHOUT MYELOPATHY OR RADICULOPATHY, CERVICAL REGION: ICD-10-CM

## 2021-09-25 DIAGNOSIS — M54.2 CERVICALGIA: ICD-10-CM

## 2021-09-25 LAB
A1C WITH ESTIMATED AVERAGE GLUCOSE RESULT: 5.4 % — SIGNIFICANT CHANGE UP (ref 4–5.6)
ESTIMATED AVERAGE GLUCOSE: 108 MG/DL — SIGNIFICANT CHANGE UP (ref 68–114)
MRSA PCR RESULT.: SIGNIFICANT CHANGE UP
S AUREUS DNA NOSE QL NAA+PROBE: SIGNIFICANT CHANGE UP

## 2021-09-28 ENCOUNTER — APPOINTMENT (OUTPATIENT)
Dept: DISASTER EMERGENCY | Facility: CLINIC | Age: 61
End: 2021-09-28

## 2021-09-28 DIAGNOSIS — Z01.818 ENCOUNTER FOR OTHER PREPROCEDURAL EXAMINATION: ICD-10-CM

## 2021-09-29 LAB — SARS-COV-2 N GENE NPH QL NAA+PROBE: NOT DETECTED

## 2021-09-30 RX ORDER — SODIUM CHLORIDE 9 MG/ML
1000 INJECTION, SOLUTION INTRAVENOUS
Refills: 0 | Status: DISCONTINUED | OUTPATIENT
Start: 2021-10-01 | End: 2021-10-01

## 2021-09-30 RX ORDER — FENTANYL CITRATE 50 UG/ML
50 INJECTION INTRAVENOUS
Refills: 0 | Status: DISCONTINUED | OUTPATIENT
Start: 2021-10-01 | End: 2021-10-01

## 2021-09-30 RX ORDER — OXYCODONE HYDROCHLORIDE 5 MG/1
10 TABLET ORAL ONCE
Refills: 0 | Status: DISCONTINUED | OUTPATIENT
Start: 2021-10-01 | End: 2021-10-01

## 2021-09-30 RX ORDER — ONDANSETRON 8 MG/1
4 TABLET, FILM COATED ORAL ONCE
Refills: 0 | Status: DISCONTINUED | OUTPATIENT
Start: 2021-10-01 | End: 2021-10-01

## 2021-10-01 ENCOUNTER — INPATIENT (INPATIENT)
Facility: HOSPITAL | Age: 61
LOS: 0 days | Discharge: HOME | DRG: 473 | End: 2021-10-02
Attending: SPECIALIST | Admitting: SPECIALIST
Payer: COMMERCIAL

## 2021-10-01 ENCOUNTER — APPOINTMENT (OUTPATIENT)
Dept: NEUROSURGERY | Facility: HOSPITAL | Age: 61
End: 2021-10-01
Payer: COMMERCIAL

## 2021-10-01 VITALS
DIASTOLIC BLOOD PRESSURE: 55 MMHG | HEART RATE: 68 BPM | RESPIRATION RATE: 18 BRPM | TEMPERATURE: 98 F | OXYGEN SATURATION: 100 % | SYSTOLIC BLOOD PRESSURE: 106 MMHG

## 2021-10-01 DIAGNOSIS — M54.2 CERVICALGIA: ICD-10-CM

## 2021-10-01 DIAGNOSIS — M47.812 SPONDYLOSIS WITHOUT MYELOPATHY OR RADICULOPATHY, CERVICAL REGION: ICD-10-CM

## 2021-10-01 DIAGNOSIS — G95.20 UNSPECIFIED CORD COMPRESSION: ICD-10-CM

## 2021-10-01 DIAGNOSIS — Z98.890 OTHER SPECIFIED POSTPROCEDURAL STATES: Chronic | ICD-10-CM

## 2021-10-01 DIAGNOSIS — Z90.710 ACQUIRED ABSENCE OF BOTH CERVIX AND UTERUS: Chronic | ICD-10-CM

## 2021-10-01 DIAGNOSIS — M48.02 SPINAL STENOSIS, CERVICAL REGION: ICD-10-CM

## 2021-10-01 LAB
ANION GAP SERPL CALC-SCNC: 4 MMOL/L — LOW (ref 5–17)
BASOPHILS # BLD AUTO: 0.01 K/UL — SIGNIFICANT CHANGE UP (ref 0–0.2)
BASOPHILS NFR BLD AUTO: 0.2 % — SIGNIFICANT CHANGE UP (ref 0–2)
BUN SERPL-MCNC: 16 MG/DL — SIGNIFICANT CHANGE UP (ref 7–23)
CALCIUM SERPL-MCNC: 8.6 MG/DL — SIGNIFICANT CHANGE UP (ref 8.5–10.1)
CHLORIDE SERPL-SCNC: 110 MMOL/L — HIGH (ref 96–108)
CO2 SERPL-SCNC: 28 MMOL/L — SIGNIFICANT CHANGE UP (ref 22–31)
CREAT SERPL-MCNC: 0.99 MG/DL — SIGNIFICANT CHANGE UP (ref 0.5–1.3)
EOSINOPHIL # BLD AUTO: 0.01 K/UL — SIGNIFICANT CHANGE UP (ref 0–0.5)
EOSINOPHIL NFR BLD AUTO: 0.2 % — SIGNIFICANT CHANGE UP (ref 0–6)
GLUCOSE SERPL-MCNC: 186 MG/DL — HIGH (ref 70–99)
HCT VFR BLD CALC: 38.1 % — SIGNIFICANT CHANGE UP (ref 34.5–45)
HGB BLD-MCNC: 12.7 G/DL — SIGNIFICANT CHANGE UP (ref 11.5–15.5)
IMM GRANULOCYTES NFR BLD AUTO: 0.3 % — SIGNIFICANT CHANGE UP (ref 0–1.5)
LYMPHOCYTES # BLD AUTO: 0.47 K/UL — LOW (ref 1–3.3)
LYMPHOCYTES # BLD AUTO: 7.5 % — LOW (ref 13–44)
MCHC RBC-ENTMCNC: 30.6 PG — SIGNIFICANT CHANGE UP (ref 27–34)
MCHC RBC-ENTMCNC: 33.3 GM/DL — SIGNIFICANT CHANGE UP (ref 32–36)
MCV RBC AUTO: 91.8 FL — SIGNIFICANT CHANGE UP (ref 80–100)
MONOCYTES # BLD AUTO: 0.06 K/UL — SIGNIFICANT CHANGE UP (ref 0–0.9)
MONOCYTES NFR BLD AUTO: 1 % — LOW (ref 2–14)
NEUTROPHILS # BLD AUTO: 5.67 K/UL — SIGNIFICANT CHANGE UP (ref 1.8–7.4)
NEUTROPHILS NFR BLD AUTO: 90.8 % — HIGH (ref 43–77)
PLATELET # BLD AUTO: 276 K/UL — SIGNIFICANT CHANGE UP (ref 150–400)
POTASSIUM SERPL-MCNC: 4 MMOL/L — SIGNIFICANT CHANGE UP (ref 3.5–5.3)
POTASSIUM SERPL-SCNC: 4 MMOL/L — SIGNIFICANT CHANGE UP (ref 3.5–5.3)
RBC # BLD: 4.15 M/UL — SIGNIFICANT CHANGE UP (ref 3.8–5.2)
RBC # FLD: 12.8 % — SIGNIFICANT CHANGE UP (ref 10.3–14.5)
SODIUM SERPL-SCNC: 142 MMOL/L — SIGNIFICANT CHANGE UP (ref 135–145)
WBC # BLD: 6.24 K/UL — SIGNIFICANT CHANGE UP (ref 3.8–10.5)
WBC # FLD AUTO: 6.24 K/UL — SIGNIFICANT CHANGE UP (ref 3.8–10.5)

## 2021-10-01 PROCEDURE — 85025 COMPLETE CBC W/AUTO DIFF WBC: CPT

## 2021-10-01 PROCEDURE — 36415 COLL VENOUS BLD VENIPUNCTURE: CPT

## 2021-10-01 PROCEDURE — 94640 AIRWAY INHALATION TREATMENT: CPT

## 2021-10-01 PROCEDURE — C1713: CPT

## 2021-10-01 PROCEDURE — 63081 REMOVE VERT BODY DCMPRN CRVL: CPT | Mod: AS,22

## 2021-10-01 PROCEDURE — C1889: CPT

## 2021-10-01 PROCEDURE — 80048 BASIC METABOLIC PNL TOTAL CA: CPT

## 2021-10-01 PROCEDURE — 22854 INSJ BIOMECHANICAL DEVICE: CPT

## 2021-10-01 PROCEDURE — 86769 SARS-COV-2 COVID-19 ANTIBODY: CPT

## 2021-10-01 PROCEDURE — C1821: CPT

## 2021-10-01 PROCEDURE — 76000 FLUOROSCOPY <1 HR PHYS/QHP: CPT

## 2021-10-01 PROCEDURE — 22854 INSJ BIOMECHANICAL DEVICE: CPT | Mod: AS

## 2021-10-01 PROCEDURE — 63081 REMOVE VERT BODY DCMPRN CRVL: CPT | Mod: 22

## 2021-10-01 PROCEDURE — 82962 GLUCOSE BLOOD TEST: CPT

## 2021-10-01 RX ORDER — VANCOMYCIN HCL 1 G
1000 VIAL (EA) INTRAVENOUS ONCE
Refills: 0 | Status: COMPLETED | OUTPATIENT
Start: 2021-10-01 | End: 2021-10-01

## 2021-10-01 RX ORDER — ACETAMINOPHEN 500 MG
650 TABLET ORAL EVERY 6 HOURS
Refills: 0 | Status: DISCONTINUED | OUTPATIENT
Start: 2021-10-01 | End: 2021-10-02

## 2021-10-01 RX ORDER — ACETAMINOPHEN 500 MG
1000 TABLET ORAL ONCE
Refills: 0 | Status: DISCONTINUED | OUTPATIENT
Start: 2021-10-01 | End: 2021-10-02

## 2021-10-01 RX ORDER — HYDROMORPHONE HYDROCHLORIDE 2 MG/ML
0.5 INJECTION INTRAMUSCULAR; INTRAVENOUS; SUBCUTANEOUS
Refills: 0 | Status: DISCONTINUED | OUTPATIENT
Start: 2021-10-01 | End: 2021-10-02

## 2021-10-01 RX ORDER — OXYCODONE HYDROCHLORIDE 5 MG/1
10 TABLET ORAL EVERY 4 HOURS
Refills: 0 | Status: DISCONTINUED | OUTPATIENT
Start: 2021-10-01 | End: 2021-10-01

## 2021-10-01 RX ORDER — DIAZEPAM 5 MG
5 TABLET ORAL EVERY 8 HOURS
Refills: 0 | Status: DISCONTINUED | OUTPATIENT
Start: 2021-10-01 | End: 2021-10-02

## 2021-10-01 RX ORDER — HYDROMORPHONE HYDROCHLORIDE 2 MG/ML
1 INJECTION INTRAMUSCULAR; INTRAVENOUS; SUBCUTANEOUS
Refills: 0 | Status: DISCONTINUED | OUTPATIENT
Start: 2021-10-01 | End: 2021-10-01

## 2021-10-01 RX ORDER — METOCLOPRAMIDE HCL 10 MG
10 TABLET ORAL ONCE
Refills: 0 | Status: DISCONTINUED | OUTPATIENT
Start: 2021-10-01 | End: 2021-10-02

## 2021-10-01 RX ORDER — ASCORBIC ACID 60 MG
1 TABLET,CHEWABLE ORAL
Qty: 0 | Refills: 0 | DISCHARGE

## 2021-10-01 RX ORDER — L.ACIDOPH/B.ANIMALIS/B.LONGUM 15B CELL
1 CAPSULE ORAL
Qty: 0 | Refills: 0 | DISCHARGE

## 2021-10-01 RX ORDER — ANASTROZOLE 1 MG/1
1 TABLET ORAL
Qty: 0 | Refills: 0 | DISCHARGE

## 2021-10-01 RX ORDER — SENNA PLUS 8.6 MG/1
2 TABLET ORAL AT BEDTIME
Refills: 0 | Status: DISCONTINUED | OUTPATIENT
Start: 2021-10-01 | End: 2021-10-02

## 2021-10-01 RX ORDER — BUDESONIDE AND FORMOTEROL FUMARATE DIHYDRATE 160; 4.5 UG/1; UG/1
2 AEROSOL RESPIRATORY (INHALATION)
Refills: 0 | Status: DISCONTINUED | OUTPATIENT
Start: 2021-10-01 | End: 2021-10-02

## 2021-10-01 RX ORDER — FAMOTIDINE 10 MG/ML
20 INJECTION INTRAVENOUS EVERY 12 HOURS
Refills: 0 | Status: DISCONTINUED | OUTPATIENT
Start: 2021-10-01 | End: 2021-10-02

## 2021-10-01 RX ORDER — ATORVASTATIN CALCIUM 80 MG/1
20 TABLET, FILM COATED ORAL AT BEDTIME
Refills: 0 | Status: DISCONTINUED | OUTPATIENT
Start: 2021-10-01 | End: 2021-10-02

## 2021-10-01 RX ORDER — DIPHENHYDRAMINE HCL 50 MG
12.5 CAPSULE ORAL EVERY 4 HOURS
Refills: 0 | Status: DISCONTINUED | OUTPATIENT
Start: 2021-10-01 | End: 2021-10-02

## 2021-10-01 RX ORDER — VITAMIN E 100 UNIT
1 CAPSULE ORAL
Qty: 0 | Refills: 0 | DISCHARGE

## 2021-10-01 RX ORDER — ALBUTEROL 90 UG/1
2 AEROSOL, METERED ORAL
Qty: 0 | Refills: 0 | DISCHARGE

## 2021-10-01 RX ORDER — CYCLOBENZAPRINE HYDROCHLORIDE 10 MG/1
10 TABLET, FILM COATED ORAL EVERY 8 HOURS
Refills: 0 | Status: DISCONTINUED | OUTPATIENT
Start: 2021-10-01 | End: 2021-10-01

## 2021-10-01 RX ORDER — TRAMADOL HYDROCHLORIDE 50 MG/1
25 TABLET ORAL EVERY 4 HOURS
Refills: 0 | Status: DISCONTINUED | OUTPATIENT
Start: 2021-10-01 | End: 2021-10-02

## 2021-10-01 RX ORDER — OXYCODONE HYDROCHLORIDE 5 MG/1
5 TABLET ORAL
Refills: 0 | Status: DISCONTINUED | OUTPATIENT
Start: 2021-10-01 | End: 2021-10-01

## 2021-10-01 RX ORDER — SODIUM CHLORIDE 9 MG/ML
1000 INJECTION INTRAMUSCULAR; INTRAVENOUS; SUBCUTANEOUS
Refills: 0 | Status: DISCONTINUED | OUTPATIENT
Start: 2021-10-01 | End: 2021-10-02

## 2021-10-01 RX ORDER — ONDANSETRON 8 MG/1
4 TABLET, FILM COATED ORAL EVERY 6 HOURS
Refills: 0 | Status: DISCONTINUED | OUTPATIENT
Start: 2021-10-01 | End: 2021-10-02

## 2021-10-01 RX ORDER — ALBUTEROL 90 UG/1
2 AEROSOL, METERED ORAL EVERY 6 HOURS
Refills: 0 | Status: DISCONTINUED | OUTPATIENT
Start: 2021-10-01 | End: 2021-10-02

## 2021-10-01 RX ORDER — TRAMADOL HYDROCHLORIDE 50 MG/1
50 TABLET ORAL EVERY 4 HOURS
Refills: 0 | Status: DISCONTINUED | OUTPATIENT
Start: 2021-10-01 | End: 2021-10-02

## 2021-10-01 RX ORDER — ANASTROZOLE 1 MG/1
1 TABLET ORAL DAILY
Refills: 0 | Status: DISCONTINUED | OUTPATIENT
Start: 2021-10-01 | End: 2021-10-02

## 2021-10-01 RX ADMIN — FENTANYL CITRATE 50 MICROGRAM(S): 50 INJECTION INTRAVENOUS at 13:00

## 2021-10-01 RX ADMIN — FENTANYL CITRATE 50 MICROGRAM(S): 50 INJECTION INTRAVENOUS at 13:40

## 2021-10-01 RX ADMIN — BUDESONIDE AND FORMOTEROL FUMARATE DIHYDRATE 2 PUFF(S): 160; 4.5 AEROSOL RESPIRATORY (INHALATION) at 21:30

## 2021-10-01 RX ADMIN — Medication 5 MILLIGRAM(S): at 21:58

## 2021-10-01 RX ADMIN — CYCLOBENZAPRINE HYDROCHLORIDE 10 MILLIGRAM(S): 10 TABLET, FILM COATED ORAL at 17:27

## 2021-10-01 RX ADMIN — SODIUM CHLORIDE 75 MILLILITER(S): 9 INJECTION, SOLUTION INTRAVENOUS at 12:47

## 2021-10-01 RX ADMIN — Medication 250 MILLIGRAM(S): at 20:29

## 2021-10-01 RX ADMIN — ANASTROZOLE 1 MILLIGRAM(S): 1 TABLET ORAL at 21:58

## 2021-10-01 RX ADMIN — HYDROMORPHONE HYDROCHLORIDE 0.5 MILLIGRAM(S): 2 INJECTION INTRAMUSCULAR; INTRAVENOUS; SUBCUTANEOUS at 13:40

## 2021-10-01 RX ADMIN — ATORVASTATIN CALCIUM 20 MILLIGRAM(S): 80 TABLET, FILM COATED ORAL at 21:58

## 2021-10-01 RX ADMIN — FENTANYL CITRATE 50 MICROGRAM(S): 50 INJECTION INTRAVENOUS at 12:45

## 2021-10-01 RX ADMIN — ONDANSETRON 4 MILLIGRAM(S): 8 TABLET, FILM COATED ORAL at 15:38

## 2021-10-01 RX ADMIN — SODIUM CHLORIDE 75 MILLILITER(S): 9 INJECTION INTRAMUSCULAR; INTRAVENOUS; SUBCUTANEOUS at 15:00

## 2021-10-01 RX ADMIN — HYDROMORPHONE HYDROCHLORIDE 0.5 MILLIGRAM(S): 2 INJECTION INTRAMUSCULAR; INTRAVENOUS; SUBCUTANEOUS at 14:15

## 2021-10-01 RX ADMIN — SENNA PLUS 2 TABLET(S): 8.6 TABLET ORAL at 21:58

## 2021-10-01 NOTE — ASU PREOP CHECKLIST - TAMPON REMOVED
Impression: Age-related nuclear cataract, bilateral: H25.13. Plan: Patient educated on condition, at this time cataracts are not visually significant for patient. Discussed cataract surgery options in the future, continue to monitor at this time.
Impression: Long term (current) use of oral antidiabetic drugs: Z79.84. Plan: See other diabetic plan.
Impression: Pinguecula, left eye: H11.152. Plan: Educated patient on condition and importance of wearing UV protection, not visually significant, monitor. Start Pred QID OS x 1 week then taper. RTC 2-3 weeks for IOP check.
Impression: Type 2 diabetes mellitus without complications: K52.1. Plan: Patient educated on condition, importance of diet, exercise, and regular follow ups with PCP. No NPDR or DME present, good blood sugar control continue to monitor with annual DM DFE.
n/a

## 2021-10-01 NOTE — BRIEF OPERATIVE NOTE - OPERATION/FINDINGS
Anterior cervical C5 corpectomy 90% with decompression fo the cord - removal of C4/5 and C5/6 disc - expandable globus cage and anterior plate fusion C4-C6.

## 2021-10-01 NOTE — BRIEF OPERATIVE NOTE - NSICDXBRIEFPOSTOP_GEN_ALL_CORE_FT
POST-OP DIAGNOSIS:  Spondylogenic compression of cervical spinal cord 01-Oct-2021 11:59:52  Daniel Flores  Cervical spondylosis with radiculopathy 01-Oct-2021 12:00:07  Daniel Flores

## 2021-10-01 NOTE — BRIEF OPERATIVE NOTE - NSICDXBRIEFPREOP_GEN_ALL_CORE_FT
PRE-OP DIAGNOSIS:  Cervical spondylitic cord compression 01-Oct-2021 11:57:46  Daniel Flores  Cervical spondylosis with radiculopathy 01-Oct-2021 11:58:13  Daniel Flores

## 2021-10-02 ENCOUNTER — TRANSCRIPTION ENCOUNTER (OUTPATIENT)
Age: 61
End: 2021-10-02

## 2021-10-02 VITALS
SYSTOLIC BLOOD PRESSURE: 115 MMHG | RESPIRATION RATE: 16 BRPM | HEART RATE: 74 BPM | DIASTOLIC BLOOD PRESSURE: 55 MMHG | TEMPERATURE: 98 F | OXYGEN SATURATION: 100 %

## 2021-10-02 LAB
ANION GAP SERPL CALC-SCNC: 4 MMOL/L — LOW (ref 5–17)
BASOPHILS # BLD AUTO: 0.02 K/UL — SIGNIFICANT CHANGE UP (ref 0–0.2)
BASOPHILS NFR BLD AUTO: 0.2 % — SIGNIFICANT CHANGE UP (ref 0–2)
BUN SERPL-MCNC: 13 MG/DL — SIGNIFICANT CHANGE UP (ref 7–23)
CALCIUM SERPL-MCNC: 8.7 MG/DL — SIGNIFICANT CHANGE UP (ref 8.5–10.1)
CHLORIDE SERPL-SCNC: 107 MMOL/L — SIGNIFICANT CHANGE UP (ref 96–108)
CO2 SERPL-SCNC: 28 MMOL/L — SIGNIFICANT CHANGE UP (ref 22–31)
COVID-19 SPIKE DOMAIN AB INTERP: POSITIVE
COVID-19 SPIKE DOMAIN ANTIBODY RESULT: >250 U/ML — HIGH
CREAT SERPL-MCNC: 1.01 MG/DL — SIGNIFICANT CHANGE UP (ref 0.5–1.3)
EOSINOPHIL # BLD AUTO: 0 K/UL — SIGNIFICANT CHANGE UP (ref 0–0.5)
EOSINOPHIL NFR BLD AUTO: 0 % — SIGNIFICANT CHANGE UP (ref 0–6)
GLUCOSE SERPL-MCNC: 117 MG/DL — HIGH (ref 70–99)
HCT VFR BLD CALC: 39.4 % — SIGNIFICANT CHANGE UP (ref 34.5–45)
HGB BLD-MCNC: 12.9 G/DL — SIGNIFICANT CHANGE UP (ref 11.5–15.5)
IMM GRANULOCYTES NFR BLD AUTO: 0.5 % — SIGNIFICANT CHANGE UP (ref 0–1.5)
LYMPHOCYTES # BLD AUTO: 0.77 K/UL — LOW (ref 1–3.3)
LYMPHOCYTES # BLD AUTO: 6.4 % — LOW (ref 13–44)
MCHC RBC-ENTMCNC: 30.6 PG — SIGNIFICANT CHANGE UP (ref 27–34)
MCHC RBC-ENTMCNC: 32.7 GM/DL — SIGNIFICANT CHANGE UP (ref 32–36)
MCV RBC AUTO: 93.6 FL — SIGNIFICANT CHANGE UP (ref 80–100)
MONOCYTES # BLD AUTO: 0.49 K/UL — SIGNIFICANT CHANGE UP (ref 0–0.9)
MONOCYTES NFR BLD AUTO: 4 % — SIGNIFICANT CHANGE UP (ref 2–14)
NEUTROPHILS # BLD AUTO: 10.76 K/UL — HIGH (ref 1.8–7.4)
NEUTROPHILS NFR BLD AUTO: 88.9 % — HIGH (ref 43–77)
PLATELET # BLD AUTO: 306 K/UL — SIGNIFICANT CHANGE UP (ref 150–400)
POTASSIUM SERPL-MCNC: 4.3 MMOL/L — SIGNIFICANT CHANGE UP (ref 3.5–5.3)
POTASSIUM SERPL-SCNC: 4.3 MMOL/L — SIGNIFICANT CHANGE UP (ref 3.5–5.3)
RBC # BLD: 4.21 M/UL — SIGNIFICANT CHANGE UP (ref 3.8–5.2)
RBC # FLD: 13 % — SIGNIFICANT CHANGE UP (ref 10.3–14.5)
SARS-COV-2 IGG+IGM SERPL QL IA: >250 U/ML — HIGH
SARS-COV-2 IGG+IGM SERPL QL IA: POSITIVE
SODIUM SERPL-SCNC: 139 MMOL/L — SIGNIFICANT CHANGE UP (ref 135–145)
WBC # BLD: 12.1 K/UL — HIGH (ref 3.8–10.5)
WBC # FLD AUTO: 12.1 K/UL — HIGH (ref 3.8–10.5)

## 2021-10-02 RX ORDER — ACETAMINOPHEN 500 MG
2 TABLET ORAL
Qty: 0 | Refills: 0 | DISCHARGE
Start: 2021-10-02

## 2021-10-02 RX ORDER — ONDANSETRON 8 MG/1
1 TABLET, FILM COATED ORAL
Qty: 20 | Refills: 0
Start: 2021-10-02

## 2021-10-02 RX ORDER — TRAMADOL HYDROCHLORIDE 50 MG/1
0.5 TABLET ORAL
Qty: 20 | Refills: 0
Start: 2021-10-02

## 2021-10-02 RX ORDER — DIAZEPAM 5 MG
1 TABLET ORAL
Qty: 30 | Refills: 0
Start: 2021-10-02

## 2021-10-02 NOTE — PHYSICAL THERAPY INITIAL EVALUATION ADULT - ACTIVE RANGE OF MOTION EXAMINATION, REHAB EVAL
Bilateral Shoulder flex only assessed to ~ 90 degrees secondary to sx./Left UE Active ROM was WFL (within functional limits)/Right UE Active ROM was WFL (within functional limits)/Left LE Active ROM was WFL (within functional limits)/Right LE Active ROM was WFL (within functional limits)

## 2021-10-02 NOTE — DISCHARGE NOTE PROVIDER - CARE PROVIDER_API CALL
Daniel Flores; PhD)  Neurosurgery  [address], [address] [place] [address]  Phone: [phone]  Fax: [phone]  Established Patient  Follow Up Time: 2 weeks

## 2021-10-02 NOTE — DISCHARGE NOTE NURSING/CASE MANAGEMENT/SOCIAL WORK - NSDCVIVACCINE_GEN_ALL_CORE_FT
Influenza, injectable,quadrivalent, preservative free, pediatric; 12-Oct-2018 12:39; Arina Warren (RN); [employer]; VIS (VIS Presented: 12-Oct-2018);

## 2021-10-02 NOTE — DISCHARGE NOTE PROVIDER - NSDCCPCAREPLAN_GEN_ALL_CORE_FT
PRINCIPAL DISCHARGE DIAGNOSIS  Diagnosis: Stenosis, cervical spine  Assessment and Plan of Treatment:       SECONDARY DISCHARGE DIAGNOSES  Diagnosis: COPD, mild  Assessment and Plan of Treatment:     Diagnosis: Gastritis  Assessment and Plan of Treatment:     Diagnosis: History of breast cancer  Assessment and Plan of Treatment:

## 2021-10-02 NOTE — DISCHARGE NOTE PROVIDER - NSDCCPTREATMENT_GEN_ALL_CORE_FT
PRINCIPAL PROCEDURE  Procedure: Anterior cervical corpectomy with fusion  Findings and Treatment:       
no

## 2021-10-02 NOTE — DISCHARGE NOTE NURSING/CASE MANAGEMENT/SOCIAL WORK - PATIENT PORTAL LINK FT
You can access the FollowMyHealth Patient Portal offered by [facility] by registering at the following website: [email or web address]. By joining [facility]’s FollowMyHealth portal, you will also be able to view your health information using other applications (apps) compatible with our system.

## 2021-10-02 NOTE — DISCHARGE NOTE PROVIDER - HOSPITAL COURSE
61 year old female complained of progressive neck pain x 4 months after trip / fall [place] an amusement [place]. She is now status post C5 corpectomy with C4/5, C5/6 discotomy and fusion. No complications, patient tolerated surgery well. Postoperative course uncomplicated, now stable for discharge home with close outpatient follow up.

## 2021-10-02 NOTE — DISCHARGE NOTE PROVIDER - NSDCFUADDINST_GEN_ALL_CORE_FT
Monitor incision for signs of infection including redness, swelling, and discharge. You may shower but do not let water 'beat' directly on incision. Gradually increase activities / walking. Do not lift anything heavier than a gallon of milk. Continue to use your incentive spirometer. Any new numbness / tingling / weakness, any change in character or intensity of pain, or any sign of infection - call Dr Flores's office or visit the [facility] emergency room

## 2021-10-02 NOTE — PHYSICAL THERAPY INITIAL EVALUATION ADULT - PLANNED THERAPY INTERVENTIONS, PT EVAL
Pt left in bed with all observation section equipment/material intact and bed alarm activated. See above for details. No goals set, and D/C PT. Pt c/o pain sx site 6/10.

## 2021-10-02 NOTE — PHYSICAL THERAPY INITIAL EVALUATION ADULT - MANUAL MUSCLE TESTING RESULTS, REHAB EVAL
No resistance applied to bilateral shoulder flex/abd at least 3/5 strength./no strength deficits were identified

## 2021-10-02 NOTE — DISCHARGE NOTE PROVIDER - NSDCMRMEDTOKEN_GEN_ALL_CORE_FT
acetaminophen 325 mg oral tablet: 2 tab(s) orally every 6 hours, As needed, Temp greater or equal to 38C (100.4F)  Albuterol (Eqv-ProAir HFA) 90 mcg/inh inhalation aerosol: 2 puff(s) inhaled every 6 hours  anastrozole 1 mg oral tablet: 1 tab(s) orally once a day  B Complex 50 oral tablet, extended release: 1 tab(s) orally once a day  biotin 1000 mcg oral tablet: 1 tab(s) orally once a day  diazePAM 5 mg oral tablet: 1 tab(s) orally every 8 hours, As needed, muscle spasm MDD:3  ondansetron 4 mg oral disintegrating strip: 1 strip(s) orally every 8 hours, As Needed -for nausea   Probiotic Formula oral capsule: 1 cap(s) orally once a day  rosuvastatin 5 mg oral tablet: orally once a day  Symbicort 160 mcg-4.5 mcg/inh inhalation aerosol: 2 puff(s) inhaled 2 times a day, As Needed  traMADol 50 mg oral tablet: 0.5 tab(s) orally every 4 hours, As needed, Mild Pain (1 - 3) MDD:6  Vitamin C 1000 mg oral tablet: 1 tab(s) orally once a day  Vitamin D3 5000 intl units oral capsule: orally once a week  vitamin E alpha 1000 intl units oral capsule: 1 tab(s) orally once a day

## 2021-10-02 NOTE — PHYSICAL THERAPY INITIAL EVALUATION ADULT - REHAB POTENTIAL, PT EVAL
Pt independent with amb, transfers, and stairs and is not a skilled acute care setting PT candidate. Pt can amb with floor care, D/C PT.

## 2021-10-05 DIAGNOSIS — K29.70 GASTRITIS, UNSPECIFIED, WITHOUT BLEEDING: ICD-10-CM

## 2021-10-05 DIAGNOSIS — M43.02 SPONDYLOLYSIS, CERVICAL REGION: ICD-10-CM

## 2021-10-05 DIAGNOSIS — M54.12 RADICULOPATHY, CERVICAL REGION: ICD-10-CM

## 2021-10-05 DIAGNOSIS — Z85.3 PERSONAL HISTORY OF MALIGNANT NEOPLASM OF BREAST: ICD-10-CM

## 2021-10-05 DIAGNOSIS — J44.9 CHRONIC OBSTRUCTIVE PULMONARY DISEASE, UNSPECIFIED: ICD-10-CM

## 2021-10-05 PROBLEM — Z87.39 PERSONAL HISTORY OF OTHER DISEASES OF THE MUSCULOSKELETAL SYSTEM AND CONNECTIVE TISSUE: Chronic | Status: ACTIVE | Noted: 2021-09-24

## 2021-10-05 PROBLEM — Z92.29 PERSONAL HISTORY OF OTHER DRUG THERAPY: Chronic | Status: ACTIVE | Noted: 2021-09-24

## 2021-10-05 PROBLEM — J44.1 CHRONIC OBSTRUCTIVE PULMONARY DISEASE WITH (ACUTE) EXACERBATION: Chronic | Status: ACTIVE | Noted: 2021-09-24

## 2021-10-05 PROBLEM — C50.919 MALIGNANT NEOPLASM OF UNSPECIFIED SITE OF UNSPECIFIED FEMALE BREAST: Chronic | Status: ACTIVE | Noted: 2021-09-24

## 2021-10-06 ENCOUNTER — NON-APPOINTMENT (OUTPATIENT)
Age: 61
End: 2021-10-06

## 2021-10-13 ENCOUNTER — APPOINTMENT (OUTPATIENT)
Dept: NEUROSURGERY | Facility: CLINIC | Age: 61
End: 2021-10-13
Payer: COMMERCIAL

## 2021-10-13 VITALS
TEMPERATURE: 97.3 F | OXYGEN SATURATION: 98 % | SYSTOLIC BLOOD PRESSURE: 113 MMHG | DIASTOLIC BLOOD PRESSURE: 75 MMHG | HEART RATE: 84 BPM | WEIGHT: 160 LBS | BODY MASS INDEX: 27.31 KG/M2 | HEIGHT: 64 IN

## 2021-10-13 PROCEDURE — 99024 POSTOP FOLLOW-UP VISIT: CPT

## 2021-10-13 NOTE — CONSULT LETTER
[Dear  ___] : Dear  [unfilled], [Courtesy Letter:] : I had the pleasure of seeing your patient, [unfilled], in my office today. [Sincerely,] : Sincerely, [FreeTextEntry2] : [id number] [FreeTextEntry1] : Bryan Bernstein is a 61-year-old female who presents today for postop evaluation.  On 10/1/2021 patient underwent anterior cervical C5 corpectomy with removal of disc at C4/5 and C5/6 and placement of a cage from C4-C6.  patient denies headaches, dizziness, or vision changes.  She reports neck achy and discomfort pain.  She reports the pain mid scapular.  There is no sharp or shooting pain.  She denies numbness or tingling.  Patient reports clumsiness to bilateral hands have improved.  She denies any swallowing difficulties, voice changes, or choking.  Patient reports her lower extremity strength is improved.  Patient denies any incisional redness, drainage, or swelling.  Denies fever or chills.  Patient presents with her cervical brace on which she wears at all times.\par \par There is no imaging to review with the patient.\par \par Patient is alert and oriented.  No distress noted.  Steri-Strips.  Removed without difficulties.  Incision without any redness, drainage, or swelling.  No fever or chills noted.  Strength to bilateral upper and lower extremities 5/5.  Reflexes to upper and lower extremities present and equal.  Patient is walking well.\par \par We will follow-up in approximately 6 weeks with a CT of the cervical spine.  Patient aware to wear her cervical collar at all times even at night.  This should remain in place for approximately 10 weeks post surgery.  At that time we will discuss physical therapy.  Patient can perform gentle arm and shoulder movements.  Patient aware to call with any further questions or concerns or with any new or worsening symptoms. [FreeTextEntry3] : Genet Sepulveda, MSN, FNP-BC\par Nurse Practitioner\par Neurosurgery\par [address] [place] [address], [address]nd floor \[place] [address] \par Office: [phone] \par Fax: [phone]\par \par

## 2021-11-26 NOTE — ED ADULT NURSE NOTE - PRO INTERPRETER NEED 2
Patient is a 86y old  Female who presents with a chief complaint of fluid overload, right ankle fracture (26 Nov 2021 09:57)    PAST MEDICAL & SURGICAL HISTORY:  HTN (hypertension)    HLD (hyperlipidemia)    Asthma    DM (diabetes mellitus)    GERD (gastroesophageal reflux disease)    Lymphedema    S/P knee replacement    INTERVAL HISTORY: Patient in bed, c/o nasal congestion. Denies chest pain. Oxygen 2L NC in use.  	  MEDICATIONS:  MEDICATIONS  (STANDING):  amLODIPine   Tablet 10 milliGRAM(s) Oral daily  atorvastatin 40 milliGRAM(s) Oral at bedtime  budesonide 160 MICROgram(s)/formoterol 4.5 MICROgram(s) Inhaler 2 Puff(s) Inhalation two times a day  buMETAnide 1 milliGRAM(s) Oral daily  cefTRIAXone   IVPB 1000 milliGRAM(s) IV Intermittent every 24 hours  cholecalciferol 2000 Unit(s) Oral daily  dextrose 40% Gel 15 Gram(s) Oral once  dextrose 5%. 1000 milliLiter(s) (50 mL/Hr) IV Continuous <Continuous>  dextrose 5%. 1000 milliLiter(s) (100 mL/Hr) IV Continuous <Continuous>  dextrose 50% Injectable 25 Gram(s) IV Push once  dextrose 50% Injectable 12.5 Gram(s) IV Push once  dextrose 50% Injectable 25 Gram(s) IV Push once  enoxaparin Injectable 40 milliGRAM(s) SubCutaneous every 12 hours  glucagon  Injectable 1 milliGRAM(s) IntraMuscular once  hydrALAZINE 100 milliGRAM(s) Oral three times a day  insulin lispro (ADMELOG) corrective regimen sliding scale   SubCutaneous three times a day before meals  insulin lispro (ADMELOG) corrective regimen sliding scale   SubCutaneous at bedtime  metoprolol succinate  milliGRAM(s) Oral daily  metroNIDAZOLE  IVPB      metroNIDAZOLE  IVPB 500 milliGRAM(s) IV Intermittent every 8 hours  montelukast 10 milliGRAM(s) Oral at bedtime  nystatin Powder 1 Application(s) Topical two times a day  polyethylene glycol 3350 17 Gram(s) Oral daily  senna 2 Tablet(s) Oral at bedtime  sodium chloride 0.65% Nasal 2 Spray(s) Both Nostrils every 6 hours    MEDICATIONS  (PRN):  acetaminophen     Tablet .. 650 milliGRAM(s) Oral every 6 hours PRN Mild Pain (1 - 3), Moderate Pain (4 - 6)  acetaminophen     Tablet .. 650 milliGRAM(s) Oral every 6 hours PRN Temp greater or equal to 38C (100.4F)  ALBUTerol    90 MICROgram(s) HFA Inhaler 2 Puff(s) Inhalation every 6 hours PRN Shortness of Breath and/or Wheezing  melatonin 3 milliGRAM(s) Oral at bedtime PRN Insomnia  oxyCODONE    IR 10 milliGRAM(s) Oral every 4 hours PRN Severe Pain (7 - 10)  oxyCODONE    IR 5 milliGRAM(s) Oral every 4 hours PRN Moderate Pain (4 - 6)      Vitals:  T(F): 99.3 (11-26-21 @ 05:11), Max: 99.3 (11-26-21 @ 05:11)  HR: 93 (11-26-21 @ 09:23) (83 - 93)  BP: 140/65 (11-26-21 @ 05:11) (111/64 - 140/65)  RR: 18 (11-26-21 @ 05:11) (18 - 18)  SpO2: 96% (11-26-21 @ 09:23) (91% - 97%)  Wt(kg): --128.7 kg    11-25 @ 07:01  -  11-26 @ 07:00  --------------------------------------------------------  IN:    Oral Fluid: 600 mL  Total IN: 600 mL    OUT:    Voided (mL): 400 mL  Total OUT: 400 mL    Total NET: 200 mL      PHYSICAL EXAM:  Neuro: Awake, responsive  CV: S1 S2 RRR  Lungs: CTABL  GI: Soft, BS +, ND, NT  Extremities: improving edema to LE, right leg in brace    TELEMETRY: Continuous O2 sat monitoring.  	 	    RADIOLOGY: < from: CT Chest w/ IV Cont (11.24.21 @ 16:34) >  FINDINGS:    Lungs, airways and pleura: No endobronchial lesion. Mosaic attenuation in both lungs, likely air trapping. Dependent atelectasis in bilateral upper and lower lobes. New right apex 0.6 cm solid nodule (4-28).    Lymph nodes, mediastinum and lower neck: Unremarkable thyroid. No enlarged lymph nodes.    Heart, pericardium and vasculature: Cardiomegaly. No pericardial effusion. Enlarged main pulmonary artery which can be seen with pulmonary hypertension. Normal caliber aorta. Subjectively mild amount of coronary calcified plaque. Calcified mitral annulus.    Bones and soft tissues: Degenerative changes spine.    Other: Limited assessment of the upper abdomen. Small right kidney cyst. Partially included possible mass in the left mid kidney (3-162).        IMPRESSION:    New right apex 0.6 cm nodule of uncertain etiology or significance. 3 month follow-up is recommended to assess for further change.    Questionable mass in the left kidney, partially included. Further evaluation with contrast-enhanced CT of the abdomen is recommended.      < end of copied text >      DIAGNOSTIC TESTING:    [x ] Echocardiogram: < from: TTE Echo Complete w/o Contrast w/ Doppler (11.19.21 @ 13:56) >  Summary:   1. Left ventricular ejection fraction, by visual estimation, is 60 to 65%.   2. Technically difficult study.   3. Normal global left ventricular systolic function.   4. Normal left ventricular internal cavity size.   5. Spectral Doppler shows impaired relaxation pattern of left ventricular myocardial filling (Grade I diastolic dysfunction).   6. There is mild concentric left ventricular hypertrophy.   7. Normal right ventricular size and function.   8. Normal left atrial size.   9. Normal right atrial size.  10. There is no evidence of pericardial effusion.  11. Mild thickening and calcification of the anterior and posterior mitral valve leaflets.  12. Moderate mitral annular calcification.  13. Degenerative tricuspidvalve.  14. Mild aortic valve stenosis.  15. Mitral valve mean gradient is 4.7 mmHg consistent with mild mitral stenosis.  16. Peak transaortic gradient equals 21.7 mmHg, mean transaortic gradient equals 10.9 mmHg, the calculated aortic valve area equals 1.69 cm² by the continuity equation consistent with mild aortic stenosis.  17. There is mild aortic root calcification.    < end of copied text >      LABS:	 	    26 Nov 2021 06:19    140    |  96     |  32     ----------------------------<  137    4.0     |  40     |  1.03   25 Nov 2021 06:19    136    |  93     |  43     ----------------------------<  135    4.1     |  42     |  1.12   24 Nov 2021 07:35    138    |  95     |  49     ----------------------------<  119    4.6     |  42     |  1.19     Ca    8.9        26 Nov 2021 06:19  Mg     3.2       25 Nov 2021 06:19    TPro  7.6    /  Alb  2.2    /  TBili  0.4    /  DBili  x      /  AST  15     /  ALT  12     /  AlkPhos  46     26 Nov 2021 06:19                          7.8    8.79  )-----------( 262      ( 26 Nov 2021 06:19 )             28.1 ,                       7.4    8.46  )-----------( 241      ( 25 Nov 2021 06:19 )             26.2 ,                       7.3    9.37  )-----------( 238      ( 24 Nov 2021 23:51 )             25.1 ,                       7.8    9.96  )-----------( 245      ( 24 Nov 2021 07:35 )             27.2                     English

## 2021-12-06 ENCOUNTER — RESULT REVIEW (OUTPATIENT)
Age: 61
End: 2021-12-06

## 2021-12-06 ENCOUNTER — OUTPATIENT (OUTPATIENT)
Dept: OUTPATIENT SERVICES | Facility: HOSPITAL | Age: 61
LOS: 1 days | End: 2021-12-06
Payer: COMMERCIAL

## 2021-12-06 ENCOUNTER — APPOINTMENT (OUTPATIENT)
Dept: NEUROSURGERY | Facility: CLINIC | Age: 61
End: 2021-12-06
Payer: COMMERCIAL

## 2021-12-06 ENCOUNTER — OUTPATIENT (OUTPATIENT)
Dept: OUTPATIENT SERVICES | Facility: HOSPITAL | Age: 61
LOS: 1 days | End: 2021-12-06

## 2021-12-06 ENCOUNTER — APPOINTMENT (OUTPATIENT)
Dept: CT IMAGING | Facility: CLINIC | Age: 61
End: 2021-12-06
Payer: COMMERCIAL

## 2021-12-06 VITALS
OXYGEN SATURATION: 98 % | DIASTOLIC BLOOD PRESSURE: 81 MMHG | HEART RATE: 68 BPM | HEIGHT: 64 IN | SYSTOLIC BLOOD PRESSURE: 117 MMHG | TEMPERATURE: 205.88 F

## 2021-12-06 DIAGNOSIS — Z98.1 ARTHRODESIS STATUS: ICD-10-CM

## 2021-12-06 DIAGNOSIS — Z98.890 OTHER SPECIFIED POSTPROCEDURAL STATES: Chronic | ICD-10-CM

## 2021-12-06 DIAGNOSIS — G95.20 UNSPECIFIED CORD COMPRESSION: ICD-10-CM

## 2021-12-06 DIAGNOSIS — Z90.710 ACQUIRED ABSENCE OF BOTH CERVIX AND UTERUS: Chronic | ICD-10-CM

## 2021-12-06 DIAGNOSIS — M48.02 SPINAL STENOSIS, CERVICAL REGION: ICD-10-CM

## 2021-12-06 DIAGNOSIS — Z00.8 ENCOUNTER FOR OTHER GENERAL EXAMINATION: ICD-10-CM

## 2021-12-06 PROCEDURE — 72125 CT NECK SPINE W/O DYE: CPT | Mod: 26

## 2021-12-06 PROCEDURE — 99024 POSTOP FOLLOW-UP VISIT: CPT

## 2021-12-06 PROCEDURE — 72125 CT NECK SPINE W/O DYE: CPT

## 2021-12-06 PROCEDURE — 76376 3D RENDER W/INTRP POSTPROCES: CPT | Mod: 26

## 2021-12-06 PROCEDURE — 76376 3D RENDER W/INTRP POSTPROCES: CPT

## 2021-12-09 ENCOUNTER — OUTPATIENT (OUTPATIENT)
Dept: OUTPATIENT SERVICES | Facility: HOSPITAL | Age: 61
LOS: 1 days | Discharge: HOME | End: 2021-12-09

## 2021-12-09 DIAGNOSIS — Z90.710 ACQUIRED ABSENCE OF BOTH CERVIX AND UTERUS: Chronic | ICD-10-CM

## 2021-12-09 DIAGNOSIS — C50.912 MALIGNANT NEOPLASM OF UNSPECIFIED SITE OF LEFT FEMALE BREAST: ICD-10-CM

## 2021-12-09 DIAGNOSIS — Z98.890 OTHER SPECIFIED POSTPROCEDURAL STATES: Chronic | ICD-10-CM

## 2021-12-10 ENCOUNTER — APPOINTMENT (OUTPATIENT)
Dept: HEMATOLOGY ONCOLOGY | Facility: CLINIC | Age: 61
End: 2021-12-10

## 2021-12-10 NOTE — ASSESSMENT
[FreeTextEntry1] : Stage I (AJCC 8 edition) poorly differentiated infiltrating ductal carcinoma left breast. [id number], Status post lumpectomy/sentinel lymph node biopsy (6 nodes), Status post CMF x 8, Status post RT\par \par -on anastrozole since 8/27/2018, tolerating it well.\par -No evidence of disease on physical exam\par -due for mammogram/breast US, ordered in [employer].\par -CT C/A/P for h/o COPD, dark sputum, abdominal bloating r/o disease; advised pt to fu with pulm ASAP\par -will get labs today\par  [FreeTextEntry2] : \par

## 2021-12-10 NOTE — PHYSICAL EXAM
[Fully active, able to carry on all pre-disease performance without restriction] : Status 0 - Fully active, able to carry on all pre-disease performance without restriction [Normal] : affect appropriate [de-identified] : right breast; no mass. Left breast: scars at 6:00 and in axilla.Left breast mildly hyperpigmented and tender, no mass.. \par right breast; no mass. Left breast: scars at 6:00 and in axilla.Left breast mildly hyperpigmented and tender, no mass.. \par  Left breast: scars at 6:00 and in axilla, no mass; right breast no mass; no axillary LAD b/l

## 2021-12-10 NOTE — REVIEW OF SYSTEMS
[Wheezing] : no wheezing [Cough] : no cough [Constipation] : constipation [Negative] : Allergic/Immunologic

## 2022-01-02 PROBLEM — G95.20 CERVICAL SPINAL CORD COMPRESSION: Status: ACTIVE | Noted: 2021-09-14

## 2022-01-02 PROBLEM — M48.02 CERVICAL STENOSIS OF SPINE: Status: ACTIVE | Noted: 2021-09-12

## 2022-01-02 NOTE — REASON FOR VISIT
[Spouse] : spouse [de-identified] : C5 anterior corpectomy and decompression and fusion from C4 to C7  [de-identified] : 10/1/2021 [de-identified] : 9

## 2022-01-02 NOTE — CONSULT LETTER
[Dear  ___] : Dear  [unfilled], [Courtesy Letter:] : I had the pleasure of seeing your patient, [unfilled], in my office today. [Sincerely,] : Sincerely, [FreeTextEntry1] : This very pleasant 61-year-old woman returns for routine postsurgical follow-up. The patient presented with cervical myelopathy and radiculopathy. The patient had an incidental trip and fall in June 2021. She underwent investigations which showed extensive spondylotic degeneration with spinal cord compression. The patient had concerning progressive problems with dropping objects from her hands and progressive problems with stability in her walking. The patient also had a significant past history of breast cancer which is HER-2 positive, as well as estrogen and progesterone receptor positive.\par \par As a result of the extensive degenerative osteoarthritic changes with spinal cord compression the patient was appropriate for a decompression and fusion surgery. The patient underwent an anterior cervical corpectomy with discectomy at C5-6 and C6-7, decompression of the large compressing osteophytes, and placement of a interbody expandable cage and fusion with allograft and anterior cervical plate. Surgery was performed at [facility] on 1 October 2021 without complication. The patient indicates that she no longer has any symptoms with respect to neck pain. She is taking no medications at the moment. She indicates improved sensation and dexterity in both hands. The patient has not yet begun physical therapy. The patient returns today after undergoing a postoperative CT scan to evaluate stability and alignment of the fusion construct.\par \par I have reviewed directly with the patient as well as her  who is present in support the postoperative images. This shows excellent decompression of the spinal cord as well as the important degenerative osteophytes extending from the inferior aspect of C4 to the superior aspect of C6. The expandable corpectomy cage is in good position as is the locking anterior cervical plate. The alignment is excellent. There is no evidence of instability.\par \par On examination the patient is in no acute distress. With the cervical collar removed the patient has very reasonable neck range of motion. The patient has very good shoulder range of motion though there is some stiffness of the trapezius, rhomboids, and sternocleidomastoid muscles. Strength is equal in the upper extremities. The patient demonstrates good dexterity. Sensation is normal to light touch and cool temperature. The patient's walking balance is excellent. There is no pronator drift. The patient's incision area has sealed extremely well with no signs of redness swelling or fluctuance. The trachea is in the midline. The patient's voice quality and swallow mechanism are normal.\par \par I am very pleased with the patient's early recovery from this extensive surgery. In 1 week the patient will be able to undertake physical therapy. A prescription has been provided. The patient may slowly increase her activity back to normal levels over the next 2 to 4 weeks. I will see the patient again in my office in 6 months. We will get a AP and lateral x-ray of the cervical spine at that time. The patient is aware of signs or symptoms that would warrant an immediate reevaluation.\par \par Thank you for very kindly including me in the evaluation and treatment of your patient. Please do not hesitate to contact me should you have any concerns or questions regarding the patient's recent cervical surgery with decompression and fusion or the patient's ongoing follow-up care plan. [FreeTextEntry3] : Daniel Flores MD, PhD, FRCPSC\par  of Neurosurgery\par [facility]\par  of Neurosurgery\par [facility]\par [address] [place] [address], [address]\[place] [address]\par Tel: [phone]\par FAX: [phone]\par

## 2022-03-29 NOTE — HISTORY OF PRESENT ILLNESS
[Disease: _____________________] : Disease: [unfilled] [T: ___] : T[unfilled] [N: ___] : N[unfilled] [M: ___] : M[unfilled] [AJCC Stage: ____] : AJCC Stage: [unfilled] [de-identified] : The patient presented in 2017, at age 57, with a left breast mass noted as an incidental finding on chest CT.\par \par The patient was referred by her pulmonologist for chest CT on 2017 for h/o COPD and was noted to have a 12 mm left breast nodule. Mammogram and breast ultrasound were performed on 2017 and demonstrated a prepectoral suspicious irregularly shaped and marginated mass with pleomorphic calcifications. Ultrasound on the same date confirmed the presence of a 1.2 cm mass.\par \par Repeat ultrasound in 2017 at [facility] confirmed the presence of a 1.2 x 0.9 cm mass. Ultrasound-guided core biopsy was performed on 2017 and was positive for poorly differentiated infiltrating ductal carcinoma which was estrogen receptor positive (greater than 99%), progesterone receptor positive (40%), HER-2/aiyana 2+ by IHC and not amplified by FISH. There was also DCIS present.\par \par Dr. Miranda Shabazz performed a left lumpectomy and sentinel lymph node biopsy on 2018. Pathology reported infiltrating ductal carcinoma, Maria A score 8/9, measuring 1.1 cm. HER-2/aiyana was equivocal by IHC and not amplified by FISH. There was also DCIS, solid and cribriform types with focal minimal necrosis and intermediate nuclear grade in the specimen. The surgical margins were negative. There were 6 negative left sentinel lymph nodes. \par \par Oncotype DX recurrence score was 37.\par \par The patient had an uneventful postoperative course. She was seen in medical oncology consultation at [facility] and presents here for an additional opinion regarding systemic adjuvant therapy.\par \par She underwent chemotherapy with CMF Q 2 weeks with Onpro, 3/16/2018 - 2018.\par \par Pt had radiation with Dr Bradley Bear 2018 at [facility]  2018.\par \par Anastrozole start 2018 to the present time.\par \par Risk factors: \par Prior breast disease: Benign cyst aspiration . Menarche: Age 12. Menopause: The patient underwent DANISH without oophorectomy in . She has had vasomotor since then and had an estradiol level of less than 5 on 2018. The patient is  2 para 1011 with her childbirth at age 25. She was unable to breast feed her daughter. Oral contraceptive use: None. Hormone replacement therapy: None. Other hormone exposure: None. Topical estrogens: None.  Family history is positive for a maternal grandmother who possibly had breast cancer in her late 60s. There is no other family history of cancer. There is no personal or family history of colorectal neoplasia. The patient is of [employer] background. Genetics testing negative, genetic counseling with Dr. Jamshid Faustin on 2020. [de-identified] : Infiltrating ductal carcinoma, Maria A score 8/9, ER positive, NM positive, HER-2/aiyana negative, Oncotype DX 37 [FreeTextEntry1] : CMF Q 2 weeks with Onpro, 3/16/2018 - 6/22/2018\par Anastrozole start 8/27/2018 [de-identified] : \par Anastrozole started 8/27/2018, tolerating it well\par Mammogram/breast US 1/13/2020 at [facility]. Has not had repeat mammo/US this year.  Pt prefers to get her scans here moving forward.\par CT C/A/P in hospital\par MRI findings of non-specific marrow signal in calvaria, skull base and vertebra which may reflect marrow replacement from anemia, drug therapy or disease. Pt does not have anemia and medications reviewed unlikely to cause marrow involvement.\par Bone scan neg\par s/p C5 corpectomy with C4/5 C5/6 and discectomy and fusion on 10/1/2021\par slightly prominent b/l cervical lymph nodes. Will repeat scans after acute illness.\par Labs today.\par Moving to [place] few months from now. \par \par HEALTH MAINTENANCE\par Last saw Saw PCP  06/2019  for acute chest congestion, was treated with Z pack.  COVID vaccine 4/21/21 PFizer.  Previous COVID infection in March 2020.  Weight loss. \par Unchanged  "serious floater and black [place]" since 01/2019, saw ophthalmologist 01/14/2019, was advised of no intervention but to continue to f/u.\par Saw pulmonologist 10/2019  for f/u COPD and management of lung function capacity, was advised function  has  improved.\par Had initial evaluation with cardiologist 10/07/2019 for f/u "calcification around her heart". The patient states she had f/u echocardiogram which was benign for calcifications around the heart. \par Saw GI  Yayo Donaldson 06/03/2020  for f/u migratory back pain radiating to her abdomen and had  f/u abdominal US, result benign. Most recent colonoscopy 12/17/2017.\par S/P DANISH without oophorectomy. Most recent GYN Onc Dr. Douglas exam 02/06/2020, exam stable, had pap and TVU.  Due now. No vaginal bleeding or spotting.\par Most recent BMD 7/12/2019 demonstrated Femoral neck: -1.8, previously -2.1. Total hip: -0.5, previously -0.2 Spine. 0.7, previously 1.2,   Osteopenia consistent with low bone mass. The patient was  referred to endocrinologist Dr Felder 07/13/2018,   patient states she declined Prolia or Boniva when offered. The patient states she is still  not willing to continue to take Prolia. Advancement Flap (Single) Text: The defect edges were debeveled with a #15 scalpel blade.  Given the location of the defect and the proximity to free margins a single advancement flap was deemed most appropriate.  Using a sterile surgical marker, an appropriate advancement flap was drawn incorporating the defect and placing the expected incisions within the relaxed skin tension lines where possible.    The area thus outlined was incised deep to adipose tissue with a #15 scalpel blade.  The skin margins were undermined to an appropriate distance in all directions utilizing iris scissors.

## 2022-05-27 ENCOUNTER — OUTPATIENT (OUTPATIENT)
Dept: OUTPATIENT SERVICES | Facility: HOSPITAL | Age: 62
LOS: 1 days | Discharge: HOME | End: 2022-05-27

## 2022-05-27 DIAGNOSIS — Z90.710 ACQUIRED ABSENCE OF BOTH CERVIX AND UTERUS: Chronic | ICD-10-CM

## 2022-05-27 DIAGNOSIS — C50.912 MALIGNANT NEOPLASM OF UNSPECIFIED SITE OF LEFT FEMALE BREAST: ICD-10-CM

## 2022-05-27 DIAGNOSIS — Z98.890 OTHER SPECIFIED POSTPROCEDURAL STATES: Chronic | ICD-10-CM

## 2022-06-03 ENCOUNTER — APPOINTMENT (OUTPATIENT)
Dept: HEMATOLOGY ONCOLOGY | Facility: CLINIC | Age: 62
End: 2022-06-03

## 2022-06-13 ENCOUNTER — OUTPATIENT (OUTPATIENT)
Dept: OUTPATIENT SERVICES | Facility: HOSPITAL | Age: 62
LOS: 1 days | End: 2022-06-13
Payer: COMMERCIAL

## 2022-06-13 ENCOUNTER — RESULT REVIEW (OUTPATIENT)
Age: 62
End: 2022-06-13

## 2022-06-13 ENCOUNTER — APPOINTMENT (OUTPATIENT)
Dept: RADIOLOGY | Facility: CLINIC | Age: 62
End: 2022-06-13
Payer: COMMERCIAL

## 2022-06-13 ENCOUNTER — APPOINTMENT (OUTPATIENT)
Dept: NEUROSURGERY | Facility: CLINIC | Age: 62
End: 2022-06-13

## 2022-06-13 DIAGNOSIS — Z98.1 ARTHRODESIS STATUS: ICD-10-CM

## 2022-06-13 DIAGNOSIS — Z90.710 ACQUIRED ABSENCE OF BOTH CERVIX AND UTERUS: Chronic | ICD-10-CM

## 2022-06-13 DIAGNOSIS — Z98.890 OTHER SPECIFIED POSTPROCEDURAL STATES: Chronic | ICD-10-CM

## 2022-06-13 DIAGNOSIS — M54.2 CERVICALGIA: ICD-10-CM

## 2022-06-13 PROCEDURE — 99213 OFFICE O/P EST LOW 20 MIN: CPT

## 2022-06-13 PROCEDURE — 72050 X-RAY EXAM NECK SPINE 4/5VWS: CPT | Mod: 26

## 2022-06-13 PROCEDURE — 72050 X-RAY EXAM NECK SPINE 4/5VWS: CPT

## 2022-06-24 ENCOUNTER — RESULT REVIEW (OUTPATIENT)
Age: 62
End: 2022-06-24

## 2022-06-24 ENCOUNTER — NON-APPOINTMENT (OUTPATIENT)
Age: 62
End: 2022-06-24

## 2022-06-24 ENCOUNTER — APPOINTMENT (OUTPATIENT)
Dept: HEMATOLOGY ONCOLOGY | Facility: CLINIC | Age: 62
End: 2022-06-24

## 2022-06-24 VITALS
SYSTOLIC BLOOD PRESSURE: 113 MMHG | OXYGEN SATURATION: 98 % | WEIGHT: 154.96 LBS | DIASTOLIC BLOOD PRESSURE: 73 MMHG | TEMPERATURE: 207.32 F | RESPIRATION RATE: 16 BRPM | BODY MASS INDEX: 26.46 KG/M2 | HEIGHT: 63.98 IN | HEART RATE: 65 BPM

## 2022-06-24 DIAGNOSIS — Z79.899 OTHER LONG TERM (CURRENT) DRUG THERAPY: ICD-10-CM

## 2022-06-24 LAB
BASOPHILS # BLD AUTO: 0.02 K/UL — SIGNIFICANT CHANGE UP (ref 0–0.2)
BASOPHILS NFR BLD AUTO: 0.4 % — SIGNIFICANT CHANGE UP (ref 0–2)
EOSINOPHIL # BLD AUTO: 0.11 K/UL — SIGNIFICANT CHANGE UP (ref 0–0.5)
EOSINOPHIL NFR BLD AUTO: 2.2 % — SIGNIFICANT CHANGE UP (ref 0–6)
HCT VFR BLD CALC: 42.8 % — SIGNIFICANT CHANGE UP (ref 34.5–45)
HGB BLD-MCNC: 14.4 G/DL — SIGNIFICANT CHANGE UP (ref 11.5–15.5)
IMM GRANULOCYTES NFR BLD AUTO: 0.2 % — SIGNIFICANT CHANGE UP (ref 0–1.5)
LYMPHOCYTES # BLD AUTO: 1.3 K/UL — SIGNIFICANT CHANGE UP (ref 1–3.3)
LYMPHOCYTES # BLD AUTO: 25.9 % — SIGNIFICANT CHANGE UP (ref 13–44)
MCHC RBC-ENTMCNC: 31 PG — SIGNIFICANT CHANGE UP (ref 27–34)
MCHC RBC-ENTMCNC: 33.6 G/DL — SIGNIFICANT CHANGE UP (ref 32–36)
MCV RBC AUTO: 92 FL — SIGNIFICANT CHANGE UP (ref 80–100)
MONOCYTES # BLD AUTO: 0.44 K/UL — SIGNIFICANT CHANGE UP (ref 0–0.9)
MONOCYTES NFR BLD AUTO: 8.8 % — SIGNIFICANT CHANGE UP (ref 2–14)
NEUTROPHILS # BLD AUTO: 3.14 K/UL — SIGNIFICANT CHANGE UP (ref 1.8–7.4)
NEUTROPHILS NFR BLD AUTO: 62.5 % — SIGNIFICANT CHANGE UP (ref 43–77)
NRBC # BLD: 0 /100 WBCS — SIGNIFICANT CHANGE UP (ref 0–0)
PLATELET # BLD AUTO: 283 K/UL — SIGNIFICANT CHANGE UP (ref 150–400)
RBC # BLD: 4.65 M/UL — SIGNIFICANT CHANGE UP (ref 3.8–5.2)
RBC # FLD: 12.5 % — SIGNIFICANT CHANGE UP (ref 10.3–14.5)
WBC # BLD: 5.02 K/UL — SIGNIFICANT CHANGE UP (ref 3.8–10.5)
WBC # FLD AUTO: 5.02 K/UL — SIGNIFICANT CHANGE UP (ref 3.8–10.5)

## 2022-06-24 PROCEDURE — 99214 OFFICE O/P EST MOD 30 MIN: CPT

## 2022-06-27 LAB
25(OH)D3 SERPL-MCNC: 43.6 NG/ML
ALBUMIN SERPL ELPH-MCNC: 4.6 G/DL
ALP BLD-CCNC: 70 U/L
ALT SERPL-CCNC: 24 U/L
ANION GAP SERPL CALC-SCNC: 12 MMOL/L
AST SERPL-CCNC: 27 U/L
BILIRUB SERPL-MCNC: 0.4 MG/DL
BUN SERPL-MCNC: 12 MG/DL
CALCIUM SERPL-MCNC: 9.4 MG/DL
CHLORIDE SERPL-SCNC: 102 MMOL/L
CHOLEST SERPL-MCNC: 191 MG/DL
CO2 SERPL-SCNC: 28 MMOL/L
CREAT SERPL-MCNC: 1.12 MG/DL
EGFR: 56 ML/MIN/1.73M2
GLUCOSE SERPL-MCNC: 77 MG/DL
HDLC SERPL-MCNC: 56 MG/DL
LDLC SERPL CALC-MCNC: 103 MG/DL
NONHDLC SERPL-MCNC: 135 MG/DL
POTASSIUM SERPL-SCNC: 5 MMOL/L
PROT SERPL-MCNC: 7.1 G/DL
SODIUM SERPL-SCNC: 143 MMOL/L
TRIGL SERPL-MCNC: 159 MG/DL

## 2022-07-11 ENCOUNTER — RESULT REVIEW (OUTPATIENT)
Age: 62
End: 2022-07-11

## 2022-07-11 ENCOUNTER — APPOINTMENT (OUTPATIENT)
Dept: ULTRASOUND IMAGING | Facility: CLINIC | Age: 62
End: 2022-07-11

## 2022-07-11 PROBLEM — Z79.899 HIGH RISK MEDICATION USE: Status: ACTIVE | Noted: 2018-05-11

## 2022-07-11 PROCEDURE — 76641 ULTRASOUND BREAST COMPLETE: CPT | Mod: 50

## 2022-07-11 NOTE — PHYSICAL EXAM
[Fully active, able to carry on all pre-disease performance without restriction] : Status 0 - Fully active, able to carry on all pre-disease performance without restriction [Normal] : affect appropriate [de-identified] : right breast; no mass. Left breast: scars at 6:00 and in axilla, nodules in left lower breast along scar line, Left breast mildly hyperpigmented and tender\par right breast; no mass. Left breast: scars at 6:00 and in axilla.Left breast mildly hyperpigmented and tender, no mass.. \par  Left breast: scars at 6:00 and in axilla, no mass; right breast no mass; no axillary LAD b/l

## 2022-07-11 NOTE — ASSESSMENT
[FreeTextEntry1] : Stage I (AJCC 8 edition) poorly differentiated infiltrating ductal carcinoma left breast. [id number], Status post lumpectomy/sentinel lymph node biopsy (6 nodes), Status post CMF x 8, Status post RT\par \par -on anastrozole since 8/27/2018, tolerating it well.\par -clinically ANGELA\par -new left breast nodules on p/e; US bilateral breast 7/11/22 showed scarring and normal appearing dense parenchyma.  \par -mammogram due in 8/2022\par -CT C/A/P on 6/29/21 and bone scan- no evidence of metastatic disease\par -will get labs today\par -FU in 6 months or sooner\par  [Curative] : Goals of care discussed with patient: Curative [FreeTextEntry2] : \par

## 2022-07-11 NOTE — HISTORY OF PRESENT ILLNESS
[Disease: _____________________] : Disease: [unfilled] [T: ___] : T[unfilled] [N: ___] : N[unfilled] [M: ___] : M[unfilled] [AJCC Stage: ____] : AJCC Stage: [unfilled] [de-identified] : The patient presented in 2017, at age 57, with a left breast mass noted as an incidental finding on chest CT.\par \par The patient was referred by her pulmonologist for chest CT on 2017 for h/o COPD and was noted to have a 12 mm left breast nodule. Mammogram and breast ultrasound were performed on 2017 and demonstrated a prepectoral suspicious irregularly shaped and marginated mass with pleomorphic calcifications. Ultrasound on the same date confirmed the presence of a 1.2 cm mass.\par \par Repeat ultrasound in 2017 at [facility] confirmed the presence of a 1.2 x 0.9 cm mass. Ultrasound-guided core biopsy was performed on 2017 and was positive for poorly differentiated infiltrating ductal carcinoma which was estrogen receptor positive (greater than 99%), progesterone receptor positive (40%), HER-2/aiyana 2+ by IHC and not amplified by FISH. There was also DCIS present.\par \par Dr. Miranda Shabazz performed a left lumpectomy and sentinel lymph node biopsy on 2018. Pathology reported infiltrating ductal carcinoma, Maria A score 8/9, measuring 1.1 cm. HER-2/aiyana was equivocal by IHC and not amplified by FISH. There was also DCIS, solid and cribriform types with focal minimal necrosis and intermediate nuclear grade in the specimen. The surgical margins were negative. There were 6 negative left sentinel lymph nodes. \par \par Oncotype DX recurrence score was 37.\par \par The patient had an uneventful postoperative course. She was seen in medical oncology consultation at [facility] and presents here for an additional opinion regarding systemic adjuvant therapy.\par \par She underwent chemotherapy with CMF Q 2 weeks with Onpro, 3/16/2018 - 2018.\par \par Pt had radiation with Dr Bradley Bear 2018 at [facility]  2018.\par \par Anastrozole start 2018 to the present time.\par \par Risk factors: \par Prior breast disease: Benign cyst aspiration . Menarche: Age 12. Menopause: The patient underwent DANISH without oophorectomy in . She has had vasomotor since then and had an estradiol level of less than 5 on 2018. The patient is  2 para 1011 with her childbirth at age 25. She was unable to breast feed her daughter. Oral contraceptive use: None. Hormone replacement therapy: None. Other hormone exposure: None. Topical estrogens: None.  Family history is positive for a maternal grandmother who possibly had breast cancer in her late 60s. There is no other family history of cancer. There is no personal or family history of colorectal neoplasia. The patient is of [employer]azi [employer] background. Genetics testing negative, genetic counseling with Dr. Jamshid Faustin on 2020. [de-identified] : Infiltrating ductal carcinoma, Maria A score 8/9, ER positive, ND positive, HER-2/aiyana negative, Oncotype DX 37 [FreeTextEntry1] : CMF Q 2 weeks with Onpro, 3/16/2018 - 6/22/2018\par Anastrozole start 8/27/2018 [de-identified] : \par Anastrozole started 8/27/2018, tolerating it well, improved occasional joint pain/stiffness in ankles, feet, left hip and heels after inactivity. Had arthritis prior to starting anastrozole which she agrees is a contributory factor to her pain. Persistent hot flashes, only in head, no other areas. \par Mammogram/breast US 8/19/21 BIRADS2\par CT C/A/P on 6/29/21 and bone scan- no evidence of metastatic disease\par Neurosurgeon Dr. 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 \par Labs today.\par \par HEALTH MAINTENANCE\par Last saw Saw PCP  06/2019  for acute chest congestion, was treated with Z pack.  COVID vaccine 4/21/21 PFizer.  Previous COVID infection in March 2020.  Weight loss. \par Unchanged  "serious floater and black [place]" since 01/2019, saw ophthalmologist 01/14/2019, was advised of no intervention but to continue to f/u.\par Saw pulmonologist 10/2019  for f/u COPD and management of lung function capacity, was advised function  has  improved.\par cardiologist 10/07/2019 for f/u "calcification around her heart". The patient states she had f/u echocardiogram which was benign for calcifications around the heart. \par Saw GI  Yayo Donaldson Most recent colonoscopy 12/17/2017. Due now, will schedule this year. \par S/P DANISH without oophorectomy. Most recent GYN Onc Dr. Douglas exam 02/06/2020, exam stable, had pap and TVU.  Due now. No vaginal bleeding or spotting.\par Most recent BMD 7/12/2019 demonstrated Femoral neck: -1.8, previously -2.1. Total hip: -0.5, previously -0.2 Spine. 0.7, previously 1.2,   Osteopenia consistent with low bone mass. The patient was  referred to endocrinologist Dr Felder 07/13/2018,   patient states she declined Prolia or Boniva when offered. The patient states she is still  not willing to continue to take Prolia. Due for repeat, order is in.

## 2022-07-11 NOTE — REVIEW OF SYSTEMS
[Constipation] : constipation [Negative] : Allergic/Immunologic [Wheezing] : no wheezing [Cough] : no cough

## 2022-08-23 ENCOUNTER — OUTPATIENT (OUTPATIENT)
Dept: OUTPATIENT SERVICES | Facility: HOSPITAL | Age: 62
LOS: 1 days | End: 2022-08-23
Payer: COMMERCIAL

## 2022-08-23 ENCOUNTER — RESULT REVIEW (OUTPATIENT)
Age: 62
End: 2022-08-23

## 2022-08-23 ENCOUNTER — APPOINTMENT (OUTPATIENT)
Dept: MAMMOGRAPHY | Facility: IMAGING CENTER | Age: 62
End: 2022-08-23

## 2022-08-23 DIAGNOSIS — Z90.710 ACQUIRED ABSENCE OF BOTH CERVIX AND UTERUS: Chronic | ICD-10-CM

## 2022-08-23 DIAGNOSIS — Z00.8 ENCOUNTER FOR OTHER GENERAL EXAMINATION: ICD-10-CM

## 2022-08-23 DIAGNOSIS — Z98.890 OTHER SPECIFIED POSTPROCEDURAL STATES: Chronic | ICD-10-CM

## 2022-08-23 PROCEDURE — 77066 DX MAMMO INCL CAD BI: CPT

## 2022-08-23 PROCEDURE — G0279: CPT | Mod: 26

## 2022-08-23 PROCEDURE — G0279: CPT

## 2022-08-23 PROCEDURE — 77066 DX MAMMO INCL CAD BI: CPT | Mod: 26

## 2022-09-14 NOTE — H&P PST ADULT - ACTIVITY
Patient is a 82y old  Male who presents with a chief complaint of Chest pain (13 Sep 2022 10:24)      HPI:  81yo/M with PMh CAD s/p stents in LAD distant , distal RCA, Crohn's disease on Stelara s/p sigmoidectomy, HTN, hyperlipidemia presented with chest pain. Started yesterday, mainly lower left side of the chest, associated with nausea and some diarrhea, lasted for few hours then got better. He thought he ate something wrong, but pain was severe and he came in to ED. Trop neg times 3   in 2021 he had back pain radiated to chest and ended up having a RCA lesion that needed a ROSLYN .   He cannot have a stress test because he has had "severe  reaction" in the past from one   He's CP free now     9/14 S/P LHC revealing unchanged anatomy, no CP at present    PAST MEDICAL & SURGICAL HISTORY:  Diverticulitis      Gastritis      Coronary artery disease involving native heart without angina pectoris, unspecified vessel or lesion type      Essential hypertension      S/P colon resection  sigmoidectomy      S/P coronary artery stent placement      S/P cholecystectomy                                        MEDICATIONS  (STANDING):  amLODIPine   Tablet 5 milliGRAM(s) Oral <User Schedule>  aspirin enteric coated 81 milliGRAM(s) Oral daily  atorvastatin 40 milliGRAM(s) Oral at bedtime  clopidogrel Tablet 75 milliGRAM(s) Oral daily  influenza  Vaccine (HIGH DOSE) 0.7 milliLiter(s) IntraMuscular once  labetalol 200 milliGRAM(s) Oral two times a day  pantoprazole    Tablet 40 milliGRAM(s) Oral before breakfast    MEDICATIONS  (PRN):  acetaminophen     Tablet .. 650 milliGRAM(s) Oral every 6 hours PRN Temp greater or equal to 38C (100.4F), Mild Pain (1 - 3)  aluminum hydroxide/magnesium hydroxide/simethicone Suspension 30 milliLiter(s) Oral every 4 hours PRN Dyspepsia  melatonin 3 milliGRAM(s) Oral at bedtime PRN Insomnia  ondansetron Injectable 4 milliGRAM(s) IV Push every 8 hours PRN Nausea and/or Vomiting      FAMILY HISTORY:  FH: coronary artery disease  father    Family history of diabetes mellitus (DM)  father        SOCIAL HISTORY:    CIGARETTES:        Vital Signs Last 24 Hrs  T(C): 36.8 (13 Sep 2022 09:00), Max: 37 (13 Sep 2022 05:32)  T(F): 98.2 (13 Sep 2022 09:00), Max: 98.6 (13 Sep 2022 05:32)  HR: 75 (13 Sep 2022 09:00) (75 - 80)  BP: 133/72 (13 Sep 2022 09:00) (133/72 - 176/86)  BP(mean): 112 (13 Sep 2022 05:32) (112 - 112)  RR: 18 (13 Sep 2022 09:00) (18 - 18)  SpO2: 99% (13 Sep 2022 09:00) (96% - 99%)    Parameters below as of 13 Sep 2022 09:00  Patient On (Oxygen Delivery Method): room air                INTERPRETATION OF TELEMETRY:    ECG:    I&O's Detail      LABS:                        12.8   8.45  )-----------( 238      ( 13 Sep 2022 05:45 )             37.9     09-13    138  |  108  |  15  ----------------------------<  103<H>  3.8   |  24  |  0.94    Ca    8.9      13 Sep 2022 05:45    TPro  8.1  /  Alb  3.3  /  TBili  0.5  /  DBili  x   /  AST  36  /  ALT  46  /  AlkPhos  76  09-13            I&O's Summary    BNPSerum Pro-Brain Natriuretic Peptide: 41 pg/mL (09-13 @ 05:45)    RADIOLOGY & ADDITIONAL STUDIES:
able to walk up 1 flight of stairs

## 2022-09-25 PROBLEM — M54.2 NECK PAIN: Status: ACTIVE | Noted: 2021-06-28

## 2022-09-25 NOTE — CONSULT LETTER
[Dear  ___] : Dear  [unfilled], [Courtesy Letter:] : I had the pleasure of seeing your patient, [unfilled], in my office today. [Sincerely,] : Sincerely, [FreeTextEntry1] : This very pleasant 62-year-old woman is seen in my office for routine surveillance evaluation after undergoing anterior cervical decompression and fusion surgery back in October 2021.  It is now 6 months since we saw the patient previously.  You may recall that the patient was initially seen in June 2021 after a spontaneous fall.  She had extensive degenerative spondylosis with spinal cord compression and myelomalacia.  She underwent definitive surgery which involved a C5 corpectomy and anterior fusion from C4-C6 on October 1, 2021 without complication.  At our last evaluation the patient indicated dramatic improvement in her arm and hand symptoms especially dexterity.  She also had improved walking balance.  Her only complaint at this point in time is noticing some neck pain when she sleeps turned toward her right-hand side.  It is muscular in nature and there is no radiation down her arms.  Bowel and bladder function remain normal.\par \par I have had the patient undergo updated x-rays of the cervical spine.  This shows good alignment and stability of the fusion construct.  There is no evidence of hardware loosening and range of motion is excellent.\par \par On examination the patient is doing extremely well with X some expected muscular discomfort in the suboccipital and paraspinal region extending to the parascapular region.  Otherwise the patient has good shoulder passive and active range of motion.  Strength and sensation in the upper extremities is normal.  Reflexes are brisk and symmetric and normal.  Ramsey sign is negative.\par \par Overall I am very pleased with the patient's recovery after important extensive spondylosis with spinal cord compression.  The patient is reassured that there is no loosening of her hardware.  It is my recommendation that we do she would continue with physical therapy maintenance for her neck.  Her current symptoms are most consistent with muscular changes related to her fusion and altered range of motion.  The patient indicated good understanding and will continue to do stretching and maintenance exercises.  At this time I have not arranged for any further follow-up but would be more than pleased to see the patient again at any time should the need arise. [FreeTextEntry3] : Daniel Flores MD, PhD, FRCPSC\par  of Neurosurgery\par [facility]\par  of Neurosurgery\par [facility] \par [address] [place] [address], [address]\[place] [address]\par Tel: [phone]\par FAX: [phone]\par Email: [email or web address]\par \par

## 2023-04-01 ENCOUNTER — OUTPATIENT (OUTPATIENT)
Dept: OUTPATIENT SERVICES | Facility: HOSPITAL | Age: 63
LOS: 1 days | Discharge: HOME | End: 2023-04-01

## 2023-04-01 DIAGNOSIS — C50.912 MALIGNANT NEOPLASM OF UNSPECIFIED SITE OF LEFT FEMALE BREAST: ICD-10-CM

## 2023-04-01 DIAGNOSIS — Z98.890 OTHER SPECIFIED POSTPROCEDURAL STATES: Chronic | ICD-10-CM

## 2023-04-01 DIAGNOSIS — Z90.710 ACQUIRED ABSENCE OF BOTH CERVIX AND UTERUS: Chronic | ICD-10-CM

## 2023-04-04 ENCOUNTER — RESULT REVIEW (OUTPATIENT)
Age: 63
End: 2023-04-04

## 2023-04-04 ENCOUNTER — APPOINTMENT (OUTPATIENT)
Dept: HEMATOLOGY ONCOLOGY | Facility: CLINIC | Age: 63
End: 2023-04-04
Payer: COMMERCIAL

## 2023-04-04 VITALS
OXYGEN SATURATION: 97 % | HEART RATE: 72 BPM | WEIGHT: 160.93 LBS | SYSTOLIC BLOOD PRESSURE: 110 MMHG | TEMPERATURE: 97.9 F | RESPIRATION RATE: 16 BRPM | DIASTOLIC BLOOD PRESSURE: 75 MMHG | BODY MASS INDEX: 27.64 KG/M2

## 2023-04-04 LAB
BASOPHILS # BLD AUTO: 0.02 K/UL — SIGNIFICANT CHANGE UP (ref 0–0.2)
BASOPHILS NFR BLD AUTO: 0.3 % — SIGNIFICANT CHANGE UP (ref 0–2)
EOSINOPHIL # BLD AUTO: 0.14 K/UL — SIGNIFICANT CHANGE UP (ref 0–0.5)
EOSINOPHIL NFR BLD AUTO: 2.4 % — SIGNIFICANT CHANGE UP (ref 0–6)
HCT VFR BLD CALC: 44.2 % — SIGNIFICANT CHANGE UP (ref 34.5–45)
HGB BLD-MCNC: 14.5 G/DL — SIGNIFICANT CHANGE UP (ref 11.5–15.5)
IMM GRANULOCYTES NFR BLD AUTO: 0.3 % — SIGNIFICANT CHANGE UP (ref 0–0.9)
LYMPHOCYTES # BLD AUTO: 1.07 K/UL — SIGNIFICANT CHANGE UP (ref 1–3.3)
LYMPHOCYTES # BLD AUTO: 18.4 % — SIGNIFICANT CHANGE UP (ref 13–44)
MCHC RBC-ENTMCNC: 30.2 PG — SIGNIFICANT CHANGE UP (ref 27–34)
MCHC RBC-ENTMCNC: 32.8 G/DL — SIGNIFICANT CHANGE UP (ref 32–36)
MCV RBC AUTO: 92.1 FL — SIGNIFICANT CHANGE UP (ref 80–100)
MONOCYTES # BLD AUTO: 0.42 K/UL — SIGNIFICANT CHANGE UP (ref 0–0.9)
MONOCYTES NFR BLD AUTO: 7.2 % — SIGNIFICANT CHANGE UP (ref 2–14)
NEUTROPHILS # BLD AUTO: 4.16 K/UL — SIGNIFICANT CHANGE UP (ref 1.8–7.4)
NEUTROPHILS NFR BLD AUTO: 71.4 % — SIGNIFICANT CHANGE UP (ref 43–77)
NRBC # BLD: 0 /100 WBCS — SIGNIFICANT CHANGE UP (ref 0–0)
PLATELET # BLD AUTO: 316 K/UL — SIGNIFICANT CHANGE UP (ref 150–400)
RBC # BLD: 4.8 M/UL — SIGNIFICANT CHANGE UP (ref 3.8–5.2)
RBC # FLD: 12.7 % — SIGNIFICANT CHANGE UP (ref 10.3–14.5)
WBC # BLD: 5.83 K/UL — SIGNIFICANT CHANGE UP (ref 3.8–10.5)
WBC # FLD AUTO: 5.83 K/UL — SIGNIFICANT CHANGE UP (ref 3.8–10.5)

## 2023-04-04 PROCEDURE — 99215 OFFICE O/P EST HI 40 MIN: CPT

## 2023-04-04 RX ORDER — ANASTROZOLE TABLETS 1 MG/1
1 TABLET ORAL DAILY
Qty: 90 | Refills: 3 | Status: ACTIVE | COMMUNITY
Start: 2018-07-03 | End: 1900-01-01

## 2023-04-05 LAB
25(OH)D3 SERPL-MCNC: 41.2 NG/ML
ALBUMIN SERPL ELPH-MCNC: 4.6 G/DL
ALP BLD-CCNC: 84 U/L
ALT SERPL-CCNC: 20 U/L
ANION GAP SERPL CALC-SCNC: 17 MMOL/L
AST SERPL-CCNC: 26 U/L
BILIRUB SERPL-MCNC: 0.2 MG/DL
BUN SERPL-MCNC: 15 MG/DL
CALCIUM SERPL-MCNC: 9.7 MG/DL
CHLORIDE SERPL-SCNC: 99 MMOL/L
CHOLEST SERPL-MCNC: 220 MG/DL
CO2 SERPL-SCNC: 24 MMOL/L
CREAT SERPL-MCNC: 1.21 MG/DL
EGFR: 50 ML/MIN/1.73M2
GLUCOSE SERPL-MCNC: 65 MG/DL
HDLC SERPL-MCNC: 61 MG/DL
LDLC SERPL CALC-MCNC: 126 MG/DL
NONHDLC SERPL-MCNC: 159 MG/DL
POTASSIUM SERPL-SCNC: 4.6 MMOL/L
PROT SERPL-MCNC: 7.2 G/DL
SODIUM SERPL-SCNC: 140 MMOL/L
TRIGL SERPL-MCNC: 168 MG/DL

## 2023-04-06 NOTE — ASSESSMENT
[Curative] : Goals of care discussed with patient: Curative [FreeTextEntry1] : 62 yo woman with history of Stage I (AJCC 8 edition) poorly differentiated infiltrating ductal carcinoma left breast in 2018. [id number], Status post lumpectomy/sentinel lymph node biopsy (6 nodes negative), Status post CMF x 8, Status post RT and started on endocrine therapy\par \par -on anastrozole since 8/27/2018, tolerating it well; plan for total of 7-10 years of therapy; script refilled\par -clinically ANGELA\par - US bilateral breast 7/11/22 showed scarring and normal appearing dense parenchyma.  \par -mammogram 8/2022 dense breast, ANGELA, BIRADS 2\par - repeat mammo/sono bilateral breast in 8/2023; orders placed in [employer]\par -CT C/A/P on 6/29/21 and bone scan- no evidence of metastatic disease\par -will get labs today including chem panel and LFTs\par - encouraged repeat bone density; order placed\par - Patient had the opportunity to have all their questions answered to their satisfaction \par - advised f/u at 6 months; as patient seeing her surgeon in 6 months, wishes to have follow-up with medical oncology on annual basis as she has in the past. Advised to call if any change in symptoms

## 2023-04-06 NOTE — REVIEW OF SYSTEMS
[Negative] : Allergic/Immunologic [Wheezing] : no wheezing [Cough] : no cough [Constipation] : no constipation

## 2023-04-06 NOTE — HISTORY OF PRESENT ILLNESS
[Disease: _____________________] : Disease: [unfilled] [T: ___] : T[unfilled] [N: ___] : N[unfilled] [M: ___] : M[unfilled] [AJCC Stage: ____] : AJCC Stage: [unfilled] [de-identified] : The patient presented in 2017, at age 57, with a left breast mass noted as an incidental finding on chest CT.\par \par The patient was referred by her pulmonologist for chest CT on 2017 for h/o COPD and was noted to have a 12 mm left breast nodule. Mammogram and breast ultrasound were performed on 2017 and demonstrated a prepectoral suspicious irregularly shaped and marginated mass with pleomorphic calcifications. Ultrasound on the same date confirmed the presence of a 1.2 cm mass.\par \par Repeat ultrasound in 2017 at [facility] confirmed the presence of a 1.2 x 0.9 cm mass. Ultrasound-guided core biopsy was performed on 2017 and was positive for poorly differentiated infiltrating ductal carcinoma which was estrogen receptor positive (greater than 99%), progesterone receptor positive (40%), HER-2/aiyana 2+ by IHC and not amplified by FISH. There was also DCIS present.\par \par Dr. Miranda Shabazz performed a left lumpectomy and sentinel lymph node biopsy on 2018. Pathology reported infiltrating ductal carcinoma, Maria A score 8/9, measuring 1.1 cm. HER-2/aiyana was equivocal by IHC and not amplified by FISH. There was also DCIS, solid and cribriform types with focal minimal necrosis and intermediate nuclear grade in the specimen. The surgical margins were negative. There were 6 negative left sentinel lymph nodes. \par \par Oncotype DX recurrence score was 37.\par \par The patient had an uneventful postoperative course. She was seen in medical oncology consultation at [facility] and presents here for an additional opinion regarding systemic adjuvant therapy.\par \par She underwent chemotherapy with CMF Q 2 weeks with Onpro, 3/16/2018 - 2018.\par \par Pt had radiation with Dr Bradley Bear 2018 at [place]  2018.\par \par Anastrozole start 2018 to the present time.\par \par Risk factors: \par Prior breast disease: Benign cyst aspiration . Menarche: Age 12. Menopause: The patient underwent DANISH without oophorectomy in . She has had vasomotor since then and had an estradiol level of less than 5 on 2018. The patient is  2 para 1011 with her childbirth at age 25. She was unable to breast feed her daughter. Oral contraceptive use: None. Hormone replacement therapy: None. Other hormone exposure: None. Topical estrogens: None.  Family history is positive for a maternal grandmother who possibly had breast cancer in her late 60s. There is no other family history of cancer. There is no personal or family history of colorectal neoplasia. The patient is of Ashkenazi Anab[employer]ist background. Genetics testing negative, genetic counseling with Dr. Jamshid Faustin on 2020. [de-identified] : Infiltrating ductal carcinoma, Maria A score 8/9, ER positive, WI positive, HER-2/aiyana negative, Oncotype DX 37 [FreeTextEntry1] : CMF Q 2 weeks with Onpro, 3/16/2018 - 6/22/2018\par Anastrozole start 8/27/2018 [de-identified] : 4/4/23\par Transferring care from Dr. Cavanaugh to me today; initially was seen by Dr. Anaya.\par All of the patient's prior records including radiology, pathology and prior notes reviewed; Past Medical History, Past Surgical History, Family History and Social history reviewed and updated in the patient's chart. \par \par Patient returns today to rule out progression or recurrence of breast cancer and to assess treatment toxicity. \par Anastrozole started 8/27/2018, tolerating it well, improved occasional joint pain/stiffness in ankles, feet, left hip and heels after inactivity. Had arthritis prior to starting anastrozole which she agrees is a contributory factor to her pain. Persistent hot flashes, only in head, no other areas. \par Mammogram/breast US 8/23/22 BIRADS2\par CT C/A/P on 6/29/21 and bone scan- no evidence of metastatic disease\par Neurosurgeon Dr. 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 \par Labs today; fasting lipid profile from cardiologist reviewed and normal\par \par HEALTH MAINTENANCE\par Following with pulmonologist in [place] annually for COPD\par Following with cardiologist as needed\par Saw GI  Yayo Donaldson Most recent colonoscopy 12/17/2017. Overdue\par S/P DANISH without oophorectomy. Most recent GYN Onc Dr. Douglas exam 02/06/2020, exam stable, had pap and TVU.  Due now. No vaginal bleeding or spotting.\par Most recent BMD 7/12/2019 demonstrated Femoral neck: -1.8, previously -2.1. Total hip: -0.5, previously -0.2 Spine. 0.7, previously 1.2,   Osteopenia consistent with low bone mass. The patient was  referred to endocrinologist Dr Felder 07/13/2018,   patient states she declined Prolia or Boniva when offered. The patient states she is still  not willing to continue to take Prolia. Due for repeat; order placed

## 2023-04-06 NOTE — PHYSICAL EXAM
[Fully active, able to carry on all pre-disease performance without restriction] : Status 0 - Fully active, able to carry on all pre-disease performance without restriction [Normal] : affect appropriate [de-identified] : right breast; no mass. Left breast: scars at 6:00 and in axilla, nodules in left lower breast along scar line unchanged, Left breast mildly hyperpigmented and tender; \par right breast; no mass. Left breast: scars at 6:00 and in axilla.Left breast mildly hyperpigmented and tender, no mass.. \par  Left breast: scars at 6:00 and in axilla, no mass; right breast no mass; no axillary LAD b/l

## 2023-05-03 ENCOUNTER — OUTPATIENT (OUTPATIENT)
Dept: OUTPATIENT SERVICES | Facility: HOSPITAL | Age: 63
LOS: 1 days | End: 2023-05-03
Payer: COMMERCIAL

## 2023-05-03 ENCOUNTER — APPOINTMENT (OUTPATIENT)
Dept: RADIOLOGY | Facility: IMAGING CENTER | Age: 63
End: 2023-05-03
Payer: COMMERCIAL

## 2023-05-03 DIAGNOSIS — M85.80 OTHER SPECIFIED DISORDERS OF BONE DENSITY AND STRUCTURE, UNSPECIFIED SITE: ICD-10-CM

## 2023-05-03 DIAGNOSIS — Z90.710 ACQUIRED ABSENCE OF BOTH CERVIX AND UTERUS: Chronic | ICD-10-CM

## 2023-05-03 PROCEDURE — 77080 DXA BONE DENSITY AXIAL: CPT

## 2023-05-03 PROCEDURE — 77080 DXA BONE DENSITY AXIAL: CPT | Mod: 26

## 2023-05-11 ENCOUNTER — NON-APPOINTMENT (OUTPATIENT)
Age: 63
End: 2023-05-11

## 2023-05-11 RX ORDER — DIPHENHYDRAMINE HCL 25 MG/1
25 CAPSULE ORAL
Qty: 1 | Refills: 0 | Status: ACTIVE | COMMUNITY
Start: 2023-05-11 | End: 1900-01-01

## 2023-05-11 RX ORDER — PREDNISONE 50 MG/1
50 TABLET ORAL
Qty: 3 | Refills: 0 | Status: ACTIVE | COMMUNITY
Start: 2023-05-11 | End: 1900-01-01

## 2023-05-15 DIAGNOSIS — C50.912 MALIGNANT NEOPLASM OF UNSPECIFIED SITE OF LEFT FEMALE BREAST: ICD-10-CM

## 2023-05-24 ENCOUNTER — OUTPATIENT (OUTPATIENT)
Dept: OUTPATIENT SERVICES | Facility: HOSPITAL | Age: 63
LOS: 1 days | End: 2023-05-24
Payer: COMMERCIAL

## 2023-05-24 ENCOUNTER — APPOINTMENT (OUTPATIENT)
Dept: CT IMAGING | Facility: CLINIC | Age: 63
End: 2023-05-24
Payer: COMMERCIAL

## 2023-05-24 DIAGNOSIS — Z90.710 ACQUIRED ABSENCE OF BOTH CERVIX AND UTERUS: Chronic | ICD-10-CM

## 2023-05-24 DIAGNOSIS — Z98.1 ARTHRODESIS STATUS: ICD-10-CM

## 2023-05-24 DIAGNOSIS — G95.20 UNSPECIFIED CORD COMPRESSION: ICD-10-CM

## 2023-05-24 DIAGNOSIS — Z00.8 ENCOUNTER FOR OTHER GENERAL EXAMINATION: ICD-10-CM

## 2023-05-24 DIAGNOSIS — M48.02 SPINAL STENOSIS, CERVICAL REGION: ICD-10-CM

## 2023-05-24 DIAGNOSIS — Z98.890 OTHER SPECIFIED POSTPROCEDURAL STATES: Chronic | ICD-10-CM

## 2023-05-24 DIAGNOSIS — M47.812 SPONDYLOSIS WITHOUT MYELOPATHY OR RADICULOPATHY, CERVICAL REGION: ICD-10-CM

## 2023-05-24 PROCEDURE — 70498 CT ANGIOGRAPHY NECK: CPT | Mod: 26

## 2023-05-24 PROCEDURE — 70498 CT ANGIOGRAPHY NECK: CPT

## 2023-05-30 ENCOUNTER — NON-APPOINTMENT (OUTPATIENT)
Age: 63
End: 2023-05-30

## 2023-05-31 ENCOUNTER — NON-APPOINTMENT (OUTPATIENT)
Age: 63
End: 2023-05-31

## 2023-07-03 ENCOUNTER — APPOINTMENT (OUTPATIENT)
Dept: NEUROSURGERY | Facility: CLINIC | Age: 63
End: 2023-07-03
Payer: COMMERCIAL

## 2023-07-03 VITALS
HEIGHT: 64 IN | SYSTOLIC BLOOD PRESSURE: 129 MMHG | BODY MASS INDEX: 27.31 KG/M2 | HEART RATE: 84 BPM | OXYGEN SATURATION: 99 % | DIASTOLIC BLOOD PRESSURE: 83 MMHG | WEIGHT: 160 LBS

## 2023-07-03 DIAGNOSIS — D32.0 BENIGN NEOPLASM OF CEREBRAL MENINGES: ICD-10-CM

## 2023-07-03 DIAGNOSIS — R49.9 UNSPECIFIED VOICE AND RESONANCE DISORDER: ICD-10-CM

## 2023-07-03 DIAGNOSIS — Z98.1 ARTHRODESIS STATUS: ICD-10-CM

## 2023-07-03 DIAGNOSIS — M47.812 SPONDYLOSIS W/OUT MYELOPATHY OR RADICULOPATHY, CERVICAL REGION: ICD-10-CM

## 2023-07-03 PROCEDURE — 99214 OFFICE O/P EST MOD 30 MIN: CPT

## 2023-07-03 NOTE — CONSULT LETTER
[Dear  ___] : Dear  [unfilled], [Courtesy Letter:] : I had the pleasure of seeing your patient, [unfilled], in my office today. [Sincerely,] : Sincerely, [FreeTextEntry1] : This very pleasant 63-year-old woman returns to our office for further evaluation of recent problems with difficulty swallowing and an unusual sensation when she is yawning. You may recall that the patient has a significant history of breast cancer, which was treated ultimately with chemotherapy and radiation. The patient presented to our office with significant degenerative spondylosis of the cervical spine with severe stenosis and myelopathy and radiculopathy. As a result of the spinal cord compression, the patient underwent an anterior approach with C6 corpectomy and fusion from C5 to C7. Post-operatively, the patient was doing extremely well and has had improvement of her hand symptoms. However, the patient indicates over the last two to three months she has had increasing problems with an experience when yawning in particular. She feels the right side of her neck is pulling or extending and this is associated with a feeling that she is unable to swallow and that she is choking. On further discussion, the patient has been seen by gastroenterologists in the past for chronic reflux disease. The patient has had some difficulty with swallowing in the past and in particular, she will have problems with rice in particular. She has not actually choked or had to have an acute intervention done for these problems. The patient returns today after undergoing a CT angiogram of the neck vessels and cranial vessels to make sure there was no vascular tethering or vascular injury related to her previous surgery.\par \par I've reviewed with the patient as well as her  who is present with her, her CT angiogram images. I have detailed that there are no abnormalities in the region of the surgery or elsewhere. In fact, previous swelling anterior to the plate has decreased by 3 millimeters compared to prior x-rays. There is no obvious extensive scarring that would correlate with the patient's current experience. The esophagus and the trachea appear to be normal in all aspects on the current imaging studies.\par \par On examination, the patient is in no acute distress. The patient performed several yawning manoeuvres and did not have any of the events that she is concerned about. The trachea is in the midline. There is no abnormal movement with swallowing. Voice quality is normal.\par \par I have reassured the patient from our surgical point of view that her fusion construct is intact with no loosening. There is no physical impact on the esophagus or the trachea. There is also no clearly evident excessive scarring or tethering of the soft tissues of the cervical region. It is my impression that the patient's difficulty may in fact be achalasia or possibly some version of trismus related to her GERD or to previous chemotherapy and some radiation exposure. I have suggested that the patient would be followed by an ENT specialist and possibly undergo a video gastrograph and swallow study to ensure that the mechanism of swallowing and esophageal motility is preserved. An upper GI endoscope by a gastroenterologist may also be necessary to properly define the patient's problem. The patient and her  who is present with her in support indicated good understanding of my descriptions and explanations. The patient is soon to be moving to [place]. At this point in time, I have not arranged for further follow-up but would be more than pleased to see the patient again at any time should the need arise. [FreeTextEntry3] :  Daniel Flores MD, PhD, FRCPSC                            Attending Neurosurgeon   of Neurosurgery  [facility]  [address], [address]nd [place] [address]  Office: [phone]  Fax: [phone]

## 2023-08-28 ENCOUNTER — APPOINTMENT (OUTPATIENT)
Dept: ULTRASOUND IMAGING | Facility: IMAGING CENTER | Age: 63
End: 2023-08-28

## 2023-08-28 ENCOUNTER — APPOINTMENT (OUTPATIENT)
Dept: MAMMOGRAPHY | Facility: IMAGING CENTER | Age: 63
End: 2023-08-28

## 2023-10-01 PROBLEM — Z98.1 S/P CERVICAL SPINAL FUSION: Status: ACTIVE | Noted: 2021-10-13

## 2023-10-01 PROBLEM — M47.812 CERVICAL SPONDYLOSIS: Status: ACTIVE | Noted: 2021-09-12

## 2023-10-01 PROBLEM — D32.0 CEREBRAL MENINGIOMA: Status: ACTIVE | Noted: 2021-09-12

## 2023-10-01 PROBLEM — R49.9 CHANGE IN VOICE: Status: ACTIVE | Noted: 2018-07-03

## 2024-04-11 ENCOUNTER — APPOINTMENT (OUTPATIENT)
Dept: HEMATOLOGY ONCOLOGY | Facility: CLINIC | Age: 64
End: 2024-04-11
